# Patient Record
Sex: FEMALE | Race: WHITE | NOT HISPANIC OR LATINO | Employment: FULL TIME | URBAN - METROPOLITAN AREA
[De-identification: names, ages, dates, MRNs, and addresses within clinical notes are randomized per-mention and may not be internally consistent; named-entity substitution may affect disease eponyms.]

---

## 2017-05-24 ENCOUNTER — TRANSFERRED RECORDS (OUTPATIENT)
Dept: CARDIOLOGY | Facility: CLINIC | Age: 62
End: 2017-05-24

## 2017-06-23 ENCOUNTER — OFFICE VISIT (OUTPATIENT)
Dept: FAMILY MEDICINE | Facility: CLINIC | Age: 62
End: 2017-06-23
Payer: COMMERCIAL

## 2017-06-23 VITALS
TEMPERATURE: 97.8 F | BODY MASS INDEX: 32.3 KG/M2 | SYSTOLIC BLOOD PRESSURE: 124 MMHG | HEIGHT: 66 IN | HEART RATE: 74 BPM | DIASTOLIC BLOOD PRESSURE: 84 MMHG | WEIGHT: 201 LBS | OXYGEN SATURATION: 98 %

## 2017-06-23 DIAGNOSIS — C50.919 MALIGNANT NEOPLASM OF FEMALE BREAST, UNSPECIFIED LATERALITY, UNSPECIFIED SITE OF BREAST: ICD-10-CM

## 2017-06-23 DIAGNOSIS — M16.11 PRIMARY OSTEOARTHRITIS OF RIGHT HIP: Primary | ICD-10-CM

## 2017-06-23 PROCEDURE — 99214 OFFICE O/P EST MOD 30 MIN: CPT | Performed by: FAMILY MEDICINE

## 2017-06-23 NOTE — NURSING NOTE
"Chief Complaint   Patient presents with     Musculoskeletal Problem       Initial /84 (BP Location: Right arm, Patient Position: Chair, Cuff Size: Adult Large)  Pulse 74  Temp 97.8  F (36.6  C) (Tympanic)  Ht 5' 6\" (1.676 m)  Wt 201 lb (91.2 kg)  SpO2 98%  BMI 32.44 kg/m2 Estimated body mass index is 32.44 kg/(m^2) as calculated from the following:    Height as of this encounter: 5' 6\" (1.676 m).    Weight as of this encounter: 201 lb (91.2 kg).  Medication Reconciliation: complete     ,Jade Thurston CMA\    "

## 2017-06-23 NOTE — PROGRESS NOTES
SUBJECTIVE:                                                    Tyra Marsh is a 62 year old female who presents to clinic today for the following health issues:      Joint Pain     Onset: x 6 weeks    Description:   Location: right hip  Character: pain is worst when going up steps    Intensity: mild    Progression of Symptoms: same    Accompanying Signs & Symptoms:  Other symptoms: none   History:   Previous similar pain: no       Precipitating factors:   Trauma or overuse: no     Alleviating factors:  Improved by: rest/inactivity       Therapies Tried and outcome: none      Problem list and histories reviewed & adjusted, as indicated.  Additional history: as documented    Patient Active Problem List   Diagnosis     Hypothyroidism     Hyperlipidemia LDL goal <130     Breast cancer (H)     Unspecified adverse effect of unspecified drug, medicinal and biological substance     Adverse effect due to correct medicinal substance, properly administered     Malignant neoplasm of female breast, unspecified laterality, unspecified site of breast (H)     Past Surgical History:   Procedure Laterality Date     BREAST SURGERY  1999, 2002    Lumpectomies     DILATION AND CURETTAGE, OPERATIVE HYSTEROSCOPY WITH MORCELLATOR, COMBINED N/A 1/15/2016    Procedure: COMBINED DILATION AND CURETTAGE, OPERATIVE HYSTEROSCOPY WITH MORCELLATOR;  Surgeon: Leslee Pack MD;  [facility]     INSERT PORT VASCULAR ACCESS N/A 12/29/2014    Procedure: INSERT PORT VASCULAR ACCESS;  Surgeon: Heena Guerra MD;  Location: [facility] OR     MASTECTOMY SIMPLE BILATERAL, SENTINEL NODE BILATERAL, COMBINED Bilateral 12/29/2014    Procedure: COMBINED MASTECTOMY SIMPLE BILATERAL, SENTINEL NODE BILATERAL;  Surgeon: Heena Guerra MD;  Location: [facility] OR     REMOVE PORT VASCULAR ACCESS N/A 2/17/2016    Procedure: REMOVE PORT VASCULAR ACCESS;  Surgeon: Leonard Bella MD;  Location:  OR       Social History   Substance Use Topics      Smoking status: Never Smoker     Smokeless tobacco: Never Used     Alcohol use 0.0 oz/week     0 Standard drinks or equivalent per week      Comment: occ. glass wine     Family History   Problem Relation Age of Onset     C.A.D. Father       age 52     Genitourinary Problems Father      kidney disease     KIDNEY DISEASE Father      Thyroid Disease Mother      HEART DISEASE Mother      CEREBROVASCULAR DISEASE Mother      TIA     Thyroid Disease Sister      Gallbladder Disease Sister          Current Outpatient Prescriptions   Medication Sig Dispense Refill     calcium-vitamin D (CALTRATE) 600-400 MG-UNIT per tablet Take 1 tablet by mouth daily        Coenzyme Q10 (COQ10) 100 MG CAPS Take 1 capsule by mouth daily       Ascorbic Acid (VITAMIN C PO) Take 500 mg by mouth daily       SYNTHROID 112 MCG tablet Take 112 mcg by mouth daily   0     letrozole (FEMARA) 2.5 MG tablet Take 2.5 mg by mouth daily       Multiple Vitamins-Minerals (MULTIVITAMIN OR) Take by mouth daily        Misc Natural Products (EQL GREEN TEA COMPLEX PO) Take by mouth daily        LevOCARNitine (L-CARNITINE) 500 MG TABS Take by mouth daily        rosuvastatin (CRESTOR) 20 MG tablet Take by mouth daily       No Known Allergies  Recent Labs   Lab Test 12/30/15 10/07/15 01/07/14 02/17/11   LDL   --    --   117  103   HDL   --    --   61  54   TRIG   --    --   163  123   ALT  20  15  21   --    CR  0.75  0.77   --    --    GFRESTIMATED  >60  >60   --    --    GFRESTBLACK  >60  >60   --    --    POTASSIUM  3.7  4.2   --    --    TSH   --    --   0.39   --       BP Readings from Last 3 Encounters:   17 124/84   16 114/88   16 106/80    Wt Readings from Last 3 Encounters:   17 201 lb (91.2 kg)   16 199 lb 1.6 oz (90.3 kg)   16 193 lb 14.4 oz (88 kg)                    Reviewed and updated as needed this visit by clinical staff       Reviewed and updated as needed this visit by Provider      "    ROS:  Constitutional, HEENT, cardiovascular, pulmonary, gi and gu systems are negative, except as otherwise noted.    OBJECTIVE:                                                    /84 (BP Location: Right arm, Patient Position: Chair, Cuff Size: Adult Large)  Pulse 74  Temp 97.8  F (36.6  C) (Tympanic)  Ht 5' 6\" (1.676 m)  Wt 201 lb (91.2 kg)  SpO2 98%  BMI 32.44 kg/m2  Body mass index is 32.44 kg/(m^2).  GENERAL: healthy, alert and no distress  NECK: no adenopathy, no asymmetry, masses, or scars and thyroid normal to palpation  RESP: lungs clear to auscultation - no rales, rhonchi or wheezes  CV: regular rate and rhythm, normal S1 S2, no S3 or S4, no murmur, click or rub, no peripheral edema and peripheral pulses strong  ABDOMEN: soft, nontender, no hepatosplenomegaly, no masses and bowel sounds normal  MS: ROSEANNA positive on right, negative SLRT, point tenderness on right trochanter           ASSESSMENT/PLAN:                                                    Tyra was seen today for musculoskeletal problem.    Diagnoses and all orders for this visit:    Primary osteoarthritis of right hip  -     MR Hip Right w/o Contrast; Future    Malignant neoplasm of female breast, unspecified laterality, unspecified site of breast (H)      Has been having worsening right hip pain without radiculopathy, has h/o breast cancer recurrence, her last tx was last year, currently she is only on fumara,   Will have her to do MR to make sure if it is related with bursitis or DJD vs other neoplastic disease related with breast cancer         Ar Amor MD  [facility] [place]  "

## 2017-06-23 NOTE — MR AVS SNAPSHOT
After Visit Summary   6/23/2017    Tyra Marsh    MRN: [id number]           Patient Information     Date Of Birth          1955        Visit Information        Provider Department      6/23/2017 11:00 AM Ar Amor MD [facility]        Today's Diagnoses     Primary osteoarthritis of right hip    -  1    Malignant neoplasm of female breast, unspecified laterality, unspecified site of breast (H)           Follow-ups after your visit        Future tests that were ordered for you today     Open Future Orders        Priority Expected Expires Ordered    MR Hip Right w/o Contrast Routine  6/23/2018 6/23/2017            Who to contact     If you have questions or need follow up information about today's clinic visit or your schedule please contact [facility] directly at [phone].  Normal or non-critical lab and imaging results will be communicated to you by MyChart, letter or phone within 4 business days after the clinic has received the results. If you do not hear from us within 7 days, please contact the clinic through MyChart or phone. If you have a critical or abnormal lab result, we will notify you by phone as soon as possible.  Submit refill requests through [employer] or call your pharmacy and they will forward the refill request to us. Please allow 3 business days for your refill to be completed.          Additional Information About Your Visit        MyChart Information     [employer] gives you secure access to your electronic health record. If you see a primary care provider, you can also send messages to your care team and make appointments. If you have questions, please call your primary care clinic.  If you do not have a primary care provider, please call [phone] and they will assist you.        Care EveryWhere ID     This is your Care EveryWhere ID. This could be used by other organizations to access your [facility]  "records  [id number]        Your Vitals Were     Pulse Temperature Height Pulse Oximetry BMI (Body Mass Index)       74 97.8  F (36.6  C) (Tympanic) 5' 6\" (1.676 m) 98% 32.44 kg/m2        Blood Pressure from Last 3 Encounters:   06/23/17 124/84   11/11/16 114/88   05/19/16 106/80    Weight from Last 3 Encounters:   06/23/17 201 lb (91.2 kg)   11/11/16 199 lb 1.6 oz (90.3 kg)   05/19/16 193 lb 14.4 oz (88 kg)               Primary Care Provider Office Phone # Fax #    Ar ALEJANDRA Amor [phone]       [address] [address]        Equal Access to Services     PHYLICIA RHOADES : Hadii aad ku hadasho Sosummer, waaxda luqadaha, qaybta kaalmada adenelyyajeff, kalyn hart . So [facility] [phone].    ATENCIÓN: Si habla español, tiene a arnett disposición servicios gratuitos de asistencia lingüística. Yokasta al [phone].    We comply with applicable federal civil rights laws and [place] laws. We do not discriminate on the basis of race, color, national origin, age, disability sex, sexual orientation or gender identity.            Thank you!     Thank you for choosing [facility]  for your care. Our goal is always to provide you with excellent care. Hearing back from our patients is one way we can continue to improve our services. Please take a few minutes to complete the written survey that you may receive in the mail after your visit with us. Thank you!             Your Updated Medication List - Protect others around you: Learn how to safely use, store and throw away your medicines at www.disposemymeds.org.          This list is accurate as of: 6/23/17 11:19 AM.  Always use your most recent med list.                   Brand Name Dispense Instructions for use Diagnosis    calcium-vitamin D 600-400 MG-UNIT per tablet    CALTRATE     Take 1 tablet by mouth daily        CoQ10 100 MG Caps      Take 1 capsule by mouth daily     "    EQL GREEN TEA COMPLEX PO      Take by mouth daily        FEMARA 2.5 MG tablet   Generic drug:  letrozole      Take 2.5 mg by mouth daily        L-Carnitine 500 MG Tabs      Take by mouth daily        MULTIVITAMIN PO      Take by mouth daily        rosuvastatin 20 MG tablet    CRESTOR     Take by mouth daily        SYNTHROID 112 MCG tablet   Generic drug:  levothyroxine      Take 112 mcg by mouth daily        VITAMIN C PO      Take 500 mg by mouth daily

## 2017-06-25 ENCOUNTER — TRANSFERRED RECORDS (OUTPATIENT)
Dept: HEALTH INFORMATION MANAGEMENT | Facility: CLINIC | Age: 62
End: 2017-06-25

## 2017-06-26 ENCOUNTER — TELEPHONE (OUTPATIENT)
Dept: FAMILY MEDICINE | Facility: CLINIC | Age: 62
End: 2017-06-26

## 2017-06-26 DIAGNOSIS — M25.551 HIP PAIN, RIGHT: Primary | ICD-10-CM

## 2017-06-26 DIAGNOSIS — M70.61 TROCHANTERIC BURSITIS OF RIGHT HIP: ICD-10-CM

## 2017-06-27 NOTE — TELEPHONE ENCOUNTER
Jes has acute inflammation on tendon and muscle on the thigh, and also has mild arthritis on hip joint. Plus, she has bursitis on the hip as was discussed during the visit   Fortunately, there is no mass nor metastatic lesion we wanted to rule out.     Conservative management including antiinflammatory medicine and PT might be a best option, and cortisone IJ on the bursa would help to control pain on the hip.   I will place PT order for her to start at [facility], please ask her if she is interested in bursa IJ  thx

## 2017-12-14 ENCOUNTER — TRANSFERRED RECORDS (OUTPATIENT)
Dept: HEALTH INFORMATION MANAGEMENT | Facility: CLINIC | Age: 62
End: 2017-12-14

## 2018-06-15 ENCOUNTER — TRANSFERRED RECORDS (OUTPATIENT)
Dept: HEALTH INFORMATION MANAGEMENT | Facility: CLINIC | Age: 63
End: 2018-06-15

## 2018-12-20 ENCOUNTER — TRANSFERRED RECORDS (OUTPATIENT)
Dept: HEALTH INFORMATION MANAGEMENT | Facility: CLINIC | Age: 63
End: 2018-12-20

## 2019-06-14 ENCOUNTER — HOSPITAL ENCOUNTER (OUTPATIENT)
Dept: BONE DENSITY | Facility: CLINIC | Age: 64
Discharge: HOME | End: 2019-06-14
Attending: INTERNAL MEDICINE | Admitting: INTERNAL MEDICINE
Payer: COMMERCIAL

## 2019-06-14 DIAGNOSIS — C50.919 BREAST CANCER (H): ICD-10-CM

## 2019-06-14 PROCEDURE — 77080 DXA BONE DENSITY AXIAL: CPT

## 2019-06-21 ENCOUNTER — TRANSFERRED RECORDS (OUTPATIENT)
Dept: HEALTH INFORMATION MANAGEMENT | Facility: CLINIC | Age: 64
End: 2019-06-21

## 2019-09-27 ENCOUNTER — TRANSFERRED RECORDS (OUTPATIENT)
Dept: HEALTH INFORMATION MANAGEMENT | Facility: CLINIC | Age: 64
End: 2019-09-27

## 2020-01-10 ENCOUNTER — TRANSFERRED RECORDS (OUTPATIENT)
Dept: HEALTH INFORMATION MANAGEMENT | Facility: CLINIC | Age: 65
End: 2020-01-10

## 2020-01-22 ENCOUNTER — OFFICE VISIT (OUTPATIENT)
Dept: FAMILY MEDICINE | Facility: CLINIC | Age: 65
End: 2020-01-22
Payer: COMMERCIAL

## 2020-01-22 ENCOUNTER — ANCILLARY PROCEDURE (OUTPATIENT)
Dept: GENERAL RADIOLOGY | Facility: CLINIC | Age: 65
End: 2020-01-22
Attending: NURSE PRACTITIONER
Payer: COMMERCIAL

## 2020-01-22 ENCOUNTER — NURSE TRIAGE (OUTPATIENT)
Dept: FAMILY MEDICINE | Facility: CLINIC | Age: 65
End: 2020-01-22

## 2020-01-22 VITALS
TEMPERATURE: 98.2 F | HEART RATE: 103 BPM | DIASTOLIC BLOOD PRESSURE: 86 MMHG | OXYGEN SATURATION: 95 % | SYSTOLIC BLOOD PRESSURE: 124 MMHG | HEIGHT: 66 IN | WEIGHT: 197 LBS | BODY MASS INDEX: 31.66 KG/M2

## 2020-01-22 DIAGNOSIS — R50.9 FEVER, UNSPECIFIED FEVER CAUSE: ICD-10-CM

## 2020-01-22 DIAGNOSIS — R05.9 COUGH: ICD-10-CM

## 2020-01-22 DIAGNOSIS — J20.9 ACUTE BRONCHITIS WITH SYMPTOMS > 10 DAYS: Primary | ICD-10-CM

## 2020-01-22 DIAGNOSIS — J10.1 INFLUENZA A: ICD-10-CM

## 2020-01-22 LAB
FLUAV+FLUBV AG SPEC QL: NEGATIVE
FLUAV+FLUBV AG SPEC QL: POSITIVE
SPECIMEN SOURCE: ABNORMAL

## 2020-01-22 PROCEDURE — 99214 OFFICE O/P EST MOD 30 MIN: CPT | Performed by: NURSE PRACTITIONER

## 2020-01-22 PROCEDURE — 71046 X-RAY EXAM CHEST 2 VIEWS: CPT | Mod: FY

## 2020-01-22 PROCEDURE — 87804 INFLUENZA ASSAY W/OPTIC: CPT | Performed by: NURSE PRACTITIONER

## 2020-01-22 RX ORDER — AZITHROMYCIN 250 MG/1
TABLET, FILM COATED ORAL
Qty: 6 TABLET | Refills: 0 | Status: SHIPPED | OUTPATIENT
Start: 2020-01-22 | End: 2020-01-27

## 2020-01-22 RX ORDER — BENZONATATE 200 MG/1
200 CAPSULE ORAL 3 TIMES DAILY PRN
Qty: 18 CAPSULE | Refills: 1 | Status: SHIPPED | OUTPATIENT
Start: 2020-01-22 | End: 2021-09-15

## 2020-01-22 ASSESSMENT — MIFFLIN-ST. JEOR: SCORE: 1470.34

## 2020-01-22 NOTE — TELEPHONE ENCOUNTER
Pt calling with cough that started on Thursday 1/16 and not improving.   was diagnosed with influenza yesterday.  Pt thinks she might have pneumonia or bronchitis and wants someone to listen to her lungs or possible chest xray.  Pt did get a flu shot this year.    Scheduled with Hilario today at 8:40.    Leann CADENA RN  EP Triage      Additional Information    Negative: Bluish (or gray) lips or face    Negative: Severe difficulty breathing (e.g., struggling for each breath, speaks in single words)    Negative: Rapid onset of cough and has hives    Negative: Coughing started suddenly after medicine, an allergic food or bee sting    Negative: Difficulty breathing after exposure to flames, smoke, or fumes    Negative: Sounds like a life-threatening emergency to the triager    Negative: Previous asthma attacks and this feels like asthma attack    Negative: Chest pain present when not coughing    Negative: Difficulty breathing    Negative: Passed out (i.e., fainted, collapsed and was not responding)    Negative: Patient sounds very sick or weak to the triager    Negative: Coughed up > 1 tablespoon (15 ml) blood (Exception: blood-tinged sputum)    Negative: Fever > 103 F (39.4 C)    Negative: Fever > 101 F (38.3 C) and over 60 years of age    Negative: Fever > 100.0 F (37.8 C) and has diabetes mellitus or a weak immune system (e.g., HIV positive, cancer chemotherapy, organ transplant, splenectomy, chronic steroids)    Negative: Fever > 100.0 F (37.8 C) and bedridden (e.g., nursing home patient, stroke, chronic illness, recovering from surgery)    Negative: Increasing ankle swelling    Negative: Wheezing is present    Negative: SEVERE coughing spells (e.g., whooping sound after coughing, vomiting after coughing)    Negative: Coughing up wendy-colored (reddish-brown) or blood-tinged sputum    Negative: Fever present > 3 days (72 hours)    Negative: Fever returns after gone for over 24 hours and symptoms worse or not  "improved    Negative: Using nasal washes and pain medicine > 24 hours and sinus pain persists    Negative: Known COPD or other severe lung disease (i.e., bronchiectasis, cystic fibrosis, lung surgery) and worsening symptoms (i.e., increased sputum purulence or amount, increased breathing difficulty)    Patient wants to be seen    Answer Assessment - Initial Assessment Questions  1. ONSET: \"When did the cough begin?\"       Thursday 1/16  2. SEVERITY: \"How bad is the cough today?\"       Coughing has increased and had a coughing spell last night where I could not catch my breath.    3. RESPIRATORY DISTRESS: \"Describe your breathing.\"       Worse with lying down and I think I hear wheezing  4. FEVER: \"Do you have a fever?\" If so, ask: \"What is your temperature, how was it measured, and when did it start?\"      I am usually 97 but temp has been running 98.? To 101 a couple of days ago  5. HEMOPTYSIS: \"Are you coughing up any blood?\" If so ask: \"How much?\" (flecks, streaks, tablespoons, etc.)      no  6. TREATMENT: \"What have you done so far to treat the cough?\" (e.g., meds, fluids, humidifier)      Taking otc medication with little relief.  7. CARDIAC HISTORY: \"Do you have any history of heart disease?\" (e.g., heart attack, congestive heart failure)       no  8. LUNG HISTORY: \"Do you have any history of lung disease?\"  (e.g., pulmonary embolus, asthma, emphysema)      no  9. PE RISK FACTORS: \"Do you have a history of blood clots?\" (or: recent major surgery, recent prolonged travel, bedridden )      no  10. OTHER SYMPTOMS: \"Do you have any other symptoms? (e.g., runny nose, wheezing, chest pain)        Wheezing I think  11. PREGNANCY: \"Is there any chance you are pregnant?\" \"When was your last menstrual period?\"        no  12. TRAVEL: \"Have you traveled out of the country in the last month?\" (e.g., travel history, exposures)        no    Protocols used: COUGH-A-[place]      "

## 2020-01-22 NOTE — PROGRESS NOTES
Subjective     Tyra Marsh is a 64 year old female who presents to clinic today for the following health issues:      RESPIRATORY SYMPTOMS      Duration: x Thursday     Description  Productive cough, wheezing, fatigue/malaise and mild chest congestion     Severity: moderate    Accompanying signs and symptoms: None    History (predisposing factors):  none    Precipitating or alleviating factors: None    Therapies tried and outcome: otc cold med     HPI: Jory presents today with the complaint of ongoing, aggressive cough. She states that she developed a cough and fever spontaneously last Thursday (6 days ago). Her fever has improved / resolved, but she states that she is now struggling with a pervasive, aggressive cough. She states that she coughs so hard that she cannot catch her breath at times. Her  was diagnosed with influenza A yesterday, but his symptoms are more constitutional (fever, chills, body aches) with only a mild cough. She is concerned about developing pneumonia. OTC remedies are only mildly helpful.     Patient Active Problem List   Diagnosis     Hypothyroidism     Hyperlipidemia LDL goal <130     Breast cancer (H)     Unspecified adverse effect of unspecified drug, medicinal and biological substance     Adverse effect due to correct medicinal substance, properly administered     Malignant neoplasm of female breast, unspecified laterality, unspecified site of breast     Past Surgical History:   Procedure Laterality Date     BREAST SURGERY  1999, 2002    Lumpectomies     DILATION AND CURETTAGE, OPERATIVE HYSTEROSCOPY WITH MORCELLATOR, COMBINED N/A 1/15/2016    Procedure: COMBINED DILATION AND CURETTAGE, OPERATIVE HYSTEROSCOPY WITH MORCELLATOR;  Surgeon: Leslee Pack MD;  Location: [facility]     INSERT PORT VASCULAR ACCESS N/A 12/29/2014    Procedure: INSERT PORT VASCULAR ACCESS;  Surgeon: Heena Guerra MD;  Location: [facility] OR     MASTECTOMY SIMPLE BILATERAL, SENTINEL NODE  "BILATERAL, COMBINED Bilateral 2014    Procedure: COMBINED MASTECTOMY SIMPLE BILATERAL, SENTINEL NODE BILATERAL;  Surgeon: Heena Guerra MD;  Location: [facility] OR     REMOVE PORT VASCULAR ACCESS N/A 2016    Procedure: REMOVE PORT VASCULAR ACCESS;  Surgeon: Leonard Bella MD;  Location:  OR       Social History     Tobacco Use     Smoking status: Never Smoker     Smokeless tobacco: Never Used   Substance Use Topics     Alcohol use: Yes     Alcohol/week: 0.0 standard drinks     Comment: occ. glass wine     Family History   Problem Relation Age of Onset     C.A.D. Father          age 52     Genitourinary Problems Father         kidney disease     Kidney Disease Father      Thyroid Disease Mother      Heart Disease Mother      Cerebrovascular Disease Mother         TIA     Thyroid Disease Sister      Gallbladder Disease Sister            Reviewed and updated as needed this visit by Provider  Tobacco  Allergies  Meds  Problems  Med Hx  Surg Hx  Fam Hx         Review of Systems   ROS COMP: Constitutional, HEENT, pulmonary systems are negative, except as otherwise noted.      Objective    /86   Pulse 103   Temp 98.2  F (36.8  C) (Tympanic)   Ht 1.676 m (5' 6\")   Wt 89.4 kg (197 lb)   SpO2 95%   BMI 31.80 kg/m    Body mass index is 31.8 kg/m .  Physical Exam   GENERAL: healthy, alert and no distress  HENT: ear canals and TM's normal, nose and mouth without ulcers or lesions  NECK: no adenopathy, no asymmetry, masses, or scars and thyroid normal to palpation  RESP: Coarse rhonchi noted throughout; No wheeze; Moist cough; Question rales in RLL; Chest excursion, respiratory rate, and ventilatory effort / ease within normal limits. No indicators of respiratory distress.  CV: regular rate and rhythm, normal S1 S2, no S3 or S4, no murmur, click or rub, no peripheral edema and peripheral pulses strong  NEURO: Normal strength and tone, mentation intact and speech normal    Diagnostic Test " Results:  Results for orders placed or performed in visit on 01/22/20 (from the past 24 hour(s))   Influenza A/B antigen   Result Value Ref Range    Influenza A/B Agn Specimen Nasopharyngeal     Influenza A Positive (A) NEG^Negative    Influenza B Negative NEG^Negative     CXR - Lung fields clear bilaterally. No evidence of infiltrate, pneumothorax, or pleural effusion. Cardiac silhouette normal in appearance.        Assessment & Plan     Tyra was seen today for fever and cough. Not surprisingly, she is also positive for influenza A ( diagnosed yesterday). She is well outside of the recommended treatment window, so antiviral therapy is deferred today. Her oxygenation (95%), aggressive / wet cough, and adventitious lung sounds prompted investigation for evidence of pneumonia or other influenza-related respiratory complication. CXR was reassuringly normal, but nature of her symptoms and marginal oxygenation prompted me to offer antimicrobial therapy for potential bacterially-driven bronchitis. Benzonatate also given for cough. Symptomatic cares (see patient instructions) and follow up precautions discussed in detail. Jory acknowledges and demonstrates understanding of circumstances under which care should be sought urgently or emergently. Follow up as discussed. Discussed risks, benefits, alternatives, potential side effects, and proper administration of new medication / treatment. Agrees with plan of care. All questions answered.     Diagnoses and all orders for this visit:    Acute bronchitis with symptoms > 10 days  -     azithromycin (ZITHROMAX) 250 MG tablet; Take 2 tablets (500 mg) by mouth daily for 1 day, THEN 1 tablet (250 mg) daily for 4 days.    Influenza A  Comment: Outside of treatment window and without risk factors to prompt antiviral treatment.     Cough  -     XR Chest 2 Views; Future  -     benzonatate (TESSALON) 200 MG capsule; Take 1 capsule (200 mg) by mouth 3 times daily as needed for  "cough    Fever, unspecified fever cause  -     XR Chest 2 Views; Future  -     Influenza A/B antigen         BMI:   Estimated body mass index is 31.8 kg/m  as calculated from the following:    Height as of this encounter: 1.676 m (5' 6\").    Weight as of this encounter: 89.4 kg (197 lb).   Weight management plan: Discussed healthy diet and exercise guidelines      See Patient Instructions    Return in about 3 days (around 1/25/2020) for persistent or worsening symptoms.    Andreas Vazquez NP  [facility] [place]    "

## 2020-01-22 NOTE — TELEPHONE ENCOUNTER
Reason for Call:  Other call back    Detailed comments: Pt wants an appt for cough. Slight fever, Spouse was in yesterday and seen George.  Spouse has influenza A.  Pt thinks maybe has pneumonia wants us to listen to lungs    Phone Number Patient can be reached at: Home number on file [phone] (home)    Best Time: anytime    Can we leave a detailed message on this number? YES    Call taken on 1/22/2020 at 7:05 AM by Bina Zhou

## 2020-10-30 ENCOUNTER — TRANSFERRED RECORDS (OUTPATIENT)
Dept: HEALTH INFORMATION MANAGEMENT | Facility: CLINIC | Age: 65
End: 2020-10-30

## 2021-07-19 ENCOUNTER — ANCILLARY PROCEDURE (OUTPATIENT)
Dept: GENERAL RADIOLOGY | Facility: CLINIC | Age: 66
End: 2021-07-19
Attending: PHYSICIAN ASSISTANT
Payer: COMMERCIAL

## 2021-07-19 ENCOUNTER — OFFICE VISIT (OUTPATIENT)
Dept: FAMILY MEDICINE | Facility: CLINIC | Age: 66
End: 2021-07-19
Payer: COMMERCIAL

## 2021-07-19 VITALS
SYSTOLIC BLOOD PRESSURE: 136 MMHG | OXYGEN SATURATION: 97 % | BODY MASS INDEX: 32.3 KG/M2 | HEIGHT: 66 IN | DIASTOLIC BLOOD PRESSURE: 86 MMHG | HEART RATE: 73 BPM | WEIGHT: 201 LBS | TEMPERATURE: 97.7 F
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
## 2021-07-19 DIAGNOSIS — R07.89 CHEST WALL PAIN: ICD-10-CM

## 2021-07-19 PROCEDURE — 71101 X-RAY EXAM UNILAT RIBS/CHEST: CPT | Mod: RT | Performed by: RADIOLOGY

## 2021-07-19 PROCEDURE — 99213 OFFICE O/P EST LOW 20 MIN: CPT | Performed by: PHYSICIAN ASSISTANT

## 2021-07-19 ASSESSMENT — ENCOUNTER SYMPTOMS
COLOR CHANGE: 1
CARDIOVASCULAR NEGATIVE: 1
SHORTNESS OF BREATH: 0
MYALGIAS: 1
CONSTITUTIONAL NEGATIVE: 1
EYES NEGATIVE: 1

## 2021-07-19 ASSESSMENT — PAIN SCALES - GENERAL: PAINLEVEL: SEVERE PAIN (6)

## 2021-07-19 ASSESSMENT — MIFFLIN-ST. JEOR: SCORE: 1478.48

## 2021-07-19 NOTE — PROGRESS NOTES
"    Assessment & Plan     Fall, sequela  - XR Ribs & Chest Right G/E 3 Views    Chest wall pain  We discussed the patient's chest wall pain as a sequela from her previous fall. A chest Xray was performed and is negative per my read.  I will reach out when I have the formal radiology report. Discussed resting, ice, and pain control through ibuprofen and tylenol. In addition, the patient was educated on the avoidance of shallow breathing that can lead to pneumonia with this condition.   - XR Ribs & Chest Right G/E 3 Views         BMI:   Estimated body mass index is 32.44 kg/m  as calculated from the following:    Height as of this encounter: 1.676 m (5' 6\").    Weight as of this encounter: 91.2 kg (201 lb).     Return in about 4 weeks (around 8/16/2021) for if new or worsening symptoms.    [facility] [place]    Kinga Corley is a 66 year old who presents for the following health issues   HPI     Musculoskeletal problem/pain  Onset/Duration: July 4th  Description  Location: ribs - right  Joint Swelling: no  Redness: no  Pain: YES  Warmth: no  Intensity:  moderate  Progression of Symptoms:  worsening  Accompanying signs and symptoms:   Fevers: no  Numbness/tingling/weakness: no  History  Trauma to the area: YES- fell forwards and hit very hard  Recent illness:  no  Previous similar problem: no  Previous evaluation:  no  Precipitating or alleviating factors:  Aggravating factors include: MOVEMENT  Therapies tried and outcome: ice and acetaminophen    Jory reports a fall on July 4th where she injured her left wrist and ribs on the right side. She was evaluated by a provider that day and they made the decision not to X-ray the affected area. Initially endorsed bruising and pain to the area which was improving. Yesterday the patient reports a sharp increase in pain during the night which woke her up from sleep. She had been lifting heavy grocery bags during the day with no " "other excessive exercises. She denies chest pain and shortness of breath but does find it painful to take deep breaths.     Review of Systems   Constitutional: Negative.    HENT: Negative.    Eyes: Negative.    Respiratory: Negative for shortness of breath.    Cardiovascular: Negative.    Musculoskeletal: Positive for myalgias.        Right lateral ribs tender to palpation.    Skin: Positive for color change.        Bruising to the right lateral rib area.          Objective    /86   Pulse 73   Temp 97.7  F (36.5  C) (Tympanic)   Ht 1.676 m (5' 6\")   Wt 91.2 kg (201 lb)   SpO2 97%   BMI 32.44 kg/m    Body mass index is 32.44 kg/m .  GENERAL: healthy, alert and no distress  EYES: Eyes grossly normal to inspection, PERRL and conjunctivae and sclerae normal  HENT: ear canals and TM's normal, nose and mouth without ulcers or lesions  NECK: no adenopathy, no asymmetry, masses, or scars and thyroid normal to palpation  RESP: lungs clear to auscultation - no rales, rhonchi or wheezes, TTP along right lateral chest wall  CV: regular rate and rhythm, normal S1 S2, no S3 or S4, no murmur, click or rub, no peripheral edema and peripheral pulses strong  ABDOMEN: soft, nontender, no hepatosplenomegaly, no masses and bowel sounds normal  MS: no gross musculoskeletal defects noted, no edema  SKIN: no suspicious lesions or rashes  NEURO: Normal strength and tone, mentation intact and speech normal  PSYCH: mentation appears normal, affect normal/bright               "

## 2021-07-22 NOTE — RESULT ENCOUNTER NOTE
Tyra-    I reached out to radiology for a second read.  The radiologist does not see evidence of a pneumothorax. Please continue with tylenol for pain and let me know how you are doing.  If your breathing symptoms worsen, we can reevaluate as we discussed on the phone.  Glenn Jerry PA-C

## 2021-08-17 ENCOUNTER — OFFICE VISIT (OUTPATIENT)
Dept: FAMILY MEDICINE | Facility: CLINIC | Age: 66
End: 2021-08-17
Payer: COMMERCIAL

## 2021-08-17 VITALS
SYSTOLIC BLOOD PRESSURE: 138 MMHG | OXYGEN SATURATION: 97 % | TEMPERATURE: 97.7 F | DIASTOLIC BLOOD PRESSURE: 84 MMHG | WEIGHT: 201.8 LBS | BODY MASS INDEX: 32.57 KG/M2 | HEART RATE: 85 BPM

## 2021-08-17 DIAGNOSIS — S22.31XD CLOSED FRACTURE OF ONE RIB OF RIGHT SIDE WITH ROUTINE HEALING, SUBSEQUENT ENCOUNTER: ICD-10-CM

## 2021-08-17 DIAGNOSIS — R07.89 CHEST WALL PAIN: Primary | ICD-10-CM

## 2021-08-17 PROCEDURE — 99213 OFFICE O/P EST LOW 20 MIN: CPT | Performed by: PHYSICIAN ASSISTANT

## 2021-08-17 ASSESSMENT — PAIN SCALES - GENERAL: PAINLEVEL: MODERATE PAIN (4)

## 2021-08-17 NOTE — PROGRESS NOTES
"    Assessment & Plan     Closed fracture of one rib of right side with routine healing, subsequent encounter  reassurance provided today.  Pain is likely MSK in origin secondary to healing fracture.  Lungs are clear today and vitals are stable She has no new SOB or cough to suggest infection.  Advise that she take ibuprofen/tylneol for pain control.  May consider giving a narcotic for use prior to an upcoming derm procedure where she will need to lay on her stomach.  She will call me if pain not improved in 3 weeks and she is needing this medication.     Chest wall pain           BMI:   Estimated body mass index is 32.57 kg/m  as calculated from the following:    Height as of 7/19/21: 1.676 m (5' 6\").    Weight as of this encounter: 91.5 kg (201 lb 12.8 oz).         Return in about 4 weeks (around 9/14/2021) for f persistent symptoms.    [facility]   Jory is a 66 year old who presents for the following health issues     HPI           Jory is a 67 y/o female patient with recent right sided anterior 9th rib fracture that occurred after a fall 6 weeks ago. Overall, symptoms have been improving but she has been having some pain in her right side and back adjacent to the fracture location and some sharp pain when she sneezes or coughs.  She does not have pain with deep breathing.  No SOB or new coughing. She is not taking anything for pain.         Review of Systems   Constitutional, HEENT, cardiovascular, pulmonary, gi and gu systems are negative, except as otherwise noted.      Objective    /84 (Cuff Size: Adult Large)   Pulse 85   Temp 97.7  F (36.5  C) (Tympanic)   Wt 91.5 kg (201 lb 12.8 oz)   SpO2 97%   BMI 32.57 kg/m    Body mass index is 32.57 kg/m .  Physical Exam   GENERAL: healthy, alert and no distress  RESP: lungs clear to auscultation - no rales, rhonchi or wheezes, TTP along right anterier, lateral and posterior chest wall  CV: " regular rate and rhythm, normal S1 S2, no S3 or S4, no murmur, click or rub,  PSYCH: mentation appears normal, affect normal/bright

## 2021-09-15 ENCOUNTER — OFFICE VISIT (OUTPATIENT)
Dept: FAMILY MEDICINE | Facility: CLINIC | Age: 66
End: 2021-09-15
Payer: COMMERCIAL

## 2021-09-15 ENCOUNTER — ANCILLARY PROCEDURE (OUTPATIENT)
Dept: GENERAL RADIOLOGY | Facility: CLINIC | Age: 66
End: 2021-09-15
Attending: FAMILY MEDICINE
Payer: COMMERCIAL

## 2021-09-15 VITALS
WEIGHT: 203 LBS | SYSTOLIC BLOOD PRESSURE: 132 MMHG | DIASTOLIC BLOOD PRESSURE: 82 MMHG | TEMPERATURE: 98.3 F | RESPIRATION RATE: 20 BRPM | OXYGEN SATURATION: 97 % | HEART RATE: 81 BPM | BODY MASS INDEX: 32.77 KG/M2

## 2021-09-15 DIAGNOSIS — R07.89 CHEST WALL PAIN: ICD-10-CM

## 2021-09-15 DIAGNOSIS — S22.31XD CLOSED FRACTURE OF ONE RIB OF RIGHT SIDE WITH ROUTINE HEALING, SUBSEQUENT ENCOUNTER: ICD-10-CM

## 2021-09-15 DIAGNOSIS — R07.89 CHEST WALL PAIN: Primary | ICD-10-CM

## 2021-09-15 DIAGNOSIS — C50.919 MALIGNANT NEOPLASM OF FEMALE BREAST, UNSPECIFIED ESTROGEN RECEPTOR STATUS, UNSPECIFIED LATERALITY, UNSPECIFIED SITE OF BREAST (H): ICD-10-CM

## 2021-09-15 PROCEDURE — 99213 OFFICE O/P EST LOW 20 MIN: CPT | Performed by: FAMILY MEDICINE

## 2021-09-15 PROCEDURE — 71101 X-RAY EXAM UNILAT RIBS/CHEST: CPT | Mod: RT | Performed by: RADIOLOGY

## 2021-09-15 ASSESSMENT — PAIN SCALES - GENERAL: PAINLEVEL: NO PAIN (0)

## 2021-09-15 NOTE — PROGRESS NOTES
"    Assessment & Plan     Chest wall pain  Had fall and rib fracture 2 months ago, has mild cough and postnasal drainage, new cxr showed no abnormal change nor mass nor sign of infection   Will keep monitoring without additional work up  - XR Ribs & Chest Right G/E 3 Views; Future    Closed fracture of one rib of right side with routine healing, subsequent encounter  Mentioned above   - XR Ribs & Chest Right G/E 3 Views; Future             BMI:   Estimated body mass index is 32.77 kg/m  as calculated from the following:    Height as of 7/19/21: 1.676 m (5' 6\").    Weight as of this encounter: 92.1 kg (203 lb).   Weight management plan: Patient referred to endocrine and/or weight management specialty    FUTURE APPOINTMENTS:       - Follow-up visit in 6 months for CPE    No follow-ups on file.    Ar Amor MD  [facility]   Pat is a 66 year old who presents for the following health issues     HPI     Concern - rib pain   Onset:  Ongoing   Description:  Pt is having pain on right side of ribcage   Intensity: mild  Progression of Symptoms:  worsening  Accompanying Signs & Symptoms: gurgling sound in when breathing in   Previous history of similar problem:   Precipitating factors:        Worsened by:   Alleviating factors:        Improved by:   Therapies tried and outcome: warm saltwater         Review of Systems   Constitutional, HEENT, cardiovascular, pulmonary, gi and gu systems are negative, except as otherwise noted.      Objective    /82   Pulse 81   Temp 98.3  F (36.8  C) (Tympanic)   Resp 20   Wt 92.1 kg (203 lb)   SpO2 97%   BMI 32.77 kg/m    Body mass index is 32.77 kg/m .  Physical Exam   GENERAL: healthy, alert and no distress  NECK: no adenopathy, no asymmetry, masses, or scars and thyroid normal to palpation  RESP: lungs clear to auscultation - no rales, rhonchi or wheezes  CV: regular rate and rhythm, normal S1 S2, no S3 or S4, no murmur, click or rub, " no peripheral edema and peripheral pulses strong  ABDOMEN: soft, nontender, no hepatosplenomegaly, no masses and bowel sounds normal  MS: no gross musculoskeletal defects noted, no edema

## 2021-12-20 ENCOUNTER — E-VISIT (OUTPATIENT)
Dept: URGENT CARE | Facility: CLINIC | Age: 66
End: 2021-12-20
Payer: COMMERCIAL

## 2021-12-20 DIAGNOSIS — R30.0 DIFFICULT OR PAINFUL URINATION: Primary | ICD-10-CM

## 2021-12-20 PROCEDURE — 99207 PR NON-BILLABLE SERV PER CHARTING: CPT | Performed by: FAMILY MEDICINE

## 2021-12-20 NOTE — PATIENT INSTRUCTIONS
Dear Tyra Marsh,    We are sorry you are not feeling well. Based on the responses you provided, it is recommended that you be seen in-person in urgent care so we can better evaluate your symptoms. Please click here to find the nearest urgent care location to you.   You will not be charged for this Visit. Thank you for trusting us with your care.    Yudi Frances MD

## 2022-01-12 ENCOUNTER — TRANSFERRED RECORDS (OUTPATIENT)
Dept: HEALTH INFORMATION MANAGEMENT | Facility: CLINIC | Age: 67
End: 2022-01-12
Payer: COMMERCIAL

## 2022-01-20 ENCOUNTER — OFFICE VISIT (OUTPATIENT)
Dept: URGENT CARE | Facility: URGENT CARE | Age: 67
End: 2022-01-20
Payer: COMMERCIAL

## 2022-01-20 VITALS — DIASTOLIC BLOOD PRESSURE: 92 MMHG | HEART RATE: 111 BPM | SYSTOLIC BLOOD PRESSURE: 152 MMHG | TEMPERATURE: 97.9 F

## 2022-01-20 DIAGNOSIS — R30.0 DYSURIA: Primary | ICD-10-CM

## 2022-01-20 DIAGNOSIS — R03.0 ELEVATED BLOOD PRESSURE READING WITHOUT DIAGNOSIS OF HYPERTENSION: ICD-10-CM

## 2022-01-20 LAB
ALBUMIN SERPL-MCNC: 3.8 G/DL (ref 3.4–5)
ALBUMIN UR-MCNC: NEGATIVE MG/DL
ALP SERPL-CCNC: 135 U/L (ref 40–150)
ALT SERPL W P-5'-P-CCNC: 27 U/L (ref 0–50)
ANION GAP SERPL CALCULATED.3IONS-SCNC: 5 MMOL/L (ref 3–14)
APPEARANCE UR: CLEAR
AST SERPL W P-5'-P-CCNC: 12 U/L (ref 0–45)
BASOPHILS # BLD AUTO: 0.1 10E3/UL (ref 0–0.2)
BASOPHILS NFR BLD AUTO: 1 %
BILIRUB SERPL-MCNC: 0.4 MG/DL (ref 0.2–1.3)
BILIRUB UR QL STRIP: NEGATIVE
BUN SERPL-MCNC: 9 MG/DL (ref 7–30)
CALCIUM SERPL-MCNC: 9.6 MG/DL (ref 8.5–10.1)
CHLORIDE BLD-SCNC: 105 MMOL/L (ref 94–109)
CLUE CELLS: ABNORMAL
CO2 SERPL-SCNC: 27 MMOL/L (ref 20–32)
COLOR UR AUTO: YELLOW
CREAT SERPL-MCNC: 0.69 MG/DL (ref 0.52–1.04)
EOSINOPHIL # BLD AUTO: 0.2 10E3/UL (ref 0–0.7)
EOSINOPHIL NFR BLD AUTO: 3 %
ERYTHROCYTE [DISTWIDTH] IN BLOOD BY AUTOMATED COUNT: 13.7 % (ref 10–15)
GFR SERPL CREATININE-BSD FRML MDRD: >90 ML/MIN/1.73M2
GLUCOSE BLD-MCNC: 101 MG/DL (ref 70–99)
GLUCOSE UR STRIP-MCNC: NEGATIVE MG/DL
HCT VFR BLD AUTO: 46.2 % (ref 35–47)
HGB BLD-MCNC: 14.3 G/DL (ref 11.7–15.7)
HGB UR QL STRIP: NEGATIVE
KETONES UR STRIP-MCNC: NEGATIVE MG/DL
LEUKOCYTE ESTERASE UR QL STRIP: NEGATIVE
LYMPHOCYTES # BLD AUTO: 1.2 10E3/UL (ref 0.8–5.3)
LYMPHOCYTES NFR BLD AUTO: 15 %
MCH RBC QN AUTO: 27.9 PG (ref 26.5–33)
MCHC RBC AUTO-ENTMCNC: 31 G/DL (ref 31.5–36.5)
MCV RBC AUTO: 90 FL (ref 78–100)
MONOCYTES # BLD AUTO: 0.5 10E3/UL (ref 0–1.3)
MONOCYTES NFR BLD AUTO: 6 %
NEUTROPHILS # BLD AUTO: 6.1 10E3/UL (ref 1.6–8.3)
NEUTROPHILS NFR BLD AUTO: 76 %
NITRATE UR QL: NEGATIVE
PH UR STRIP: 5.5 [PH] (ref 5–7)
PLATELET # BLD AUTO: 176 10E3/UL (ref 150–450)
POTASSIUM BLD-SCNC: 3.9 MMOL/L (ref 3.4–5.3)
PROT SERPL-MCNC: 7.7 G/DL (ref 6.8–8.8)
RBC # BLD AUTO: 5.13 10E6/UL (ref 3.8–5.2)
SODIUM SERPL-SCNC: 137 MMOL/L (ref 133–144)
SP GR UR STRIP: <=1.005 (ref 1–1.03)
TRICHOMONAS, WET PREP: ABNORMAL
UROBILINOGEN UR STRIP-ACNC: 0.2 E.U./DL
WBC # BLD AUTO: 8.1 10E3/UL (ref 4–11)
WBC'S/HIGH POWER FIELD, WET PREP: ABNORMAL
YEAST, WET PREP: ABNORMAL

## 2022-01-20 PROCEDURE — 81003 URINALYSIS AUTO W/O SCOPE: CPT | Performed by: NURSE PRACTITIONER

## 2022-01-20 PROCEDURE — 99214 OFFICE O/P EST MOD 30 MIN: CPT | Performed by: NURSE PRACTITIONER

## 2022-01-20 PROCEDURE — 36415 COLL VENOUS BLD VENIPUNCTURE: CPT | Performed by: NURSE PRACTITIONER

## 2022-01-20 PROCEDURE — 80053 COMPREHEN METABOLIC PANEL: CPT | Performed by: NURSE PRACTITIONER

## 2022-01-20 PROCEDURE — 87210 SMEAR WET MOUNT SALINE/INK: CPT | Performed by: NURSE PRACTITIONER

## 2022-01-20 PROCEDURE — 85025 COMPLETE CBC W/AUTO DIFF WBC: CPT | Performed by: NURSE PRACTITIONER

## 2022-01-20 NOTE — PATIENT INSTRUCTIONS
Drink extra water.    Try taking Azo which is available over-the-counter at the pharmacy to help with the pain.  Be sure to stop this at least 3 days before you see the urologist.    Check blood pressure twice a day for the next 2 weeks and keep a log of this.  If top number is greater than 140 consistently and or bottom number is greater than 90 consistently please make appointment to follow-up with your primary care provider to discuss treatment for high blood pressure.    Patient Education     Dysuria with Uncertain Cause (Adult)    The urethra is the tube that allows urine to pass out of the body. In a woman, the urethra is the opening above the vagina. In men, the urethra is the opening on the tip of the penis. Dysuria is the feeling of pain or burning in the urethra when passing urine.   Dysuria can be caused by anything that irritates or inflames the urethra. An infection or chemical irritation can cause this reaction. A bladder infection is the most common cause of dysuria in adults. A urine test can diagnose this. A bladder infection needs antibiotic treatment.   Soaps, lotions, colognes, and feminine hygiene products can cause dysuria. So can birth control jellies, creams, and foams. It will go away 1 to 3 days after using these irritants.   Sexually transmitted infections (STIs) such as chlamydia or gonorrhea can cause dysuria. Your healthcare provider may take a culture sample. Your provider may start you on antibiotic medicine before the culture test returns.   In women who have gone through menopause, dysuria can be from dryness in the lining of the urethra. This can be treated with hormones. Dysuria becomes long-term (chronic) when it lasts for weeks or months. You may need to see a specialist (urologist) to diagnose and treat chronic dysuria.   Home care  These home care tips may help:    Don't use any chemicals or products that you think may be causing your symptoms.    If you were given a  prescription medicine, take as directed. Take it until it is all used up.    If a culture was taken, don't have sex until you have been told that it is negative. A negative culture means you don't have an infection. Then follow your healthcare provider's advice to treat your condition.  If a culture was done and it is positive:     Both you and your sexual partner may need to be treated. This is true even if your partner has no symptoms.    Contact your healthcare provider or go to an urgent care clinic or the public health department to be looked at and treated.    Don't have sex until both you and your partner have finished all antibiotics and your healthcare provider says you are no longer contagious.    Learn about and use safe sex practices. The safest sex is with a partner who has tested negative and only has sex with you. Condoms can prevent STIs from spreading, but they aren't a guarantee.  Follow-up care  Follow up with your healthcare provider, or as advised. If a culture was taken, you may call as directed for the results. If you have an STI, follow up with your provider or the public health department for a complete STI screening, including HIV testing. For more information, contact CDC-INFO at 138-642-4235.   When to get medical advice  Call your healthcare provider right away if any of these occur:    You aren't better after 3 days of treatment    Fever of 100.4 F (38 C) or higher, or as directed by your provider    Back or belly pain that gets worse    You can't urinate because of pain    New discharge from the urethra, vagina, or penis    Painful sores on the penis    Rash or joint pain    Painful lumps (lymph nodes) in the groin    Testicle pain or swelling of the scrotum  [email or web address] last reviewed this educational content on 10/1/2019    0705-0150 [employer]. All rights reserved. This information is not intended as a substitute for professional medical care. Always follow your  healthcare professional's instructions.

## 2022-01-20 NOTE — PROGRESS NOTES
Chief Complaint   Patient presents with     Urgent Care     burning when urinating and urinary frequency one month.         ICD-10-CM    1. Dysuria  R30.0 UA reflex to Microscopic and Culture     Wet prep - Clinic Collect     CBC with platelets and differential     Comprehensive metabolic panel (BMP + Alb, Alk Phos, ALT, AST, Total. Bili, TP)     CBC with platelets and differential     Comprehensive metabolic panel (BMP + Alb, Alk Phos, ALT, AST, Total. Bili, TP)   2. Elevated blood pressure reading without diagnosis of hypertension  R03.0      Keep appointment with urology as scheduled on February 9.  May try Azo to help with dysuria.  Drink plenty of water.  If she develops fever over 100.4, back pain, nausea or vomiting she will return for further evaluation sooner.    Check blood pressure twice a day for the next 2 weeks and keep a log to discuss with PCP.    32 minutes spent on the date of the encounter doing chart review, history and exam, documentation and further activities per the note    Medical Decision Making    Differential Diagnosis:  UTI: UTI, Dysuria, Pyelonephritis, Kidney Stone, Urethritis, Vaginitis, Foreign body, STD and Interstitial Cystitis      Results for orders placed or performed in visit on 01/20/22 (from the past 24 hour(s))   UA reflex to Microscopic and Culture    Specimen: Urine, Midstream   Result Value Ref Range    Color Urine Yellow Colorless, Straw, Light Yellow, Yellow    Appearance Urine Clear Clear    Glucose Urine Negative Negative mg/dL    Bilirubin Urine Negative Negative    Ketones Urine Negative Negative mg/dL    Specific Gravity Urine <=1.005 1.003 - 1.035    Blood Urine Negative Negative    pH Urine 5.5 5.0 - 7.0    Protein Albumin Urine Negative Negative mg/dL    Urobilinogen Urine 0.2 0.2, 1.0 E.U./dL    Nitrite Urine Negative Negative    Leukocyte Esterase Urine Negative Negative    Narrative    Microscopic not indicated   Wet prep - Clinic Collect    Specimen: Vagina;  Swab   Result Value Ref Range    Trichomonas Absent Absent    Yeast Absent Absent    Clue Cells Absent Absent    WBCs/high power field 2+ (A) None   CBC with platelets and differential    Narrative    The following orders were created for panel order CBC with platelets and differential.  Procedure                               Abnormality         Status                     ---------                               -----------         ------                     CBC with platelets and d...[[id number]]  Abnormal            Final result                 Please view results for these tests on the individual orders.   Comprehensive metabolic panel (BMP + Alb, Alk Phos, ALT, AST, Total. Bili, TP)   Result Value Ref Range    Sodium 137 133 - 144 mmol/L    Potassium 3.9 3.4 - 5.3 mmol/L    Chloride 105 94 - 109 mmol/L    Carbon Dioxide (CO2) 27 20 - 32 mmol/L    Anion Gap 5 3 - 14 mmol/L    Urea Nitrogen 9 7 - 30 mg/dL    Creatinine 0.69 0.52 - 1.04 mg/dL    Calcium 9.6 8.5 - 10.1 mg/dL    Glucose 101 (H) 70 - 99 mg/dL    Alkaline Phosphatase 135 40 - 150 U/L    AST 12 0 - 45 U/L    ALT 27 0 - 50 U/L    Protein Total 7.7 6.8 - 8.8 g/dL    Albumin 3.8 3.4 - 5.0 g/dL    Bilirubin Total 0.4 0.2 - 1.3 mg/dL    GFR Estimate >90 >60 mL/min/1.73m2   CBC with platelets and differential   Result Value Ref Range    WBC Count 8.1 4.0 - 11.0 10e3/uL    RBC Count 5.13 3.80 - 5.20 10e6/uL    Hemoglobin 14.3 11.7 - 15.7 g/dL    Hematocrit 46.2 35.0 - 47.0 %    MCV 90 78 - 100 fL    MCH 27.9 26.5 - 33.0 pg    MCHC 31.0 (L) 31.5 - 36.5 g/dL    RDW 13.7 10.0 - 15.0 %    Platelet Count 176 150 - 450 10e3/uL    % Neutrophils 76 %    % Lymphocytes 15 %    % Monocytes 6 %    % Eosinophils 3 %    % Basophils 1 %    Absolute Neutrophils 6.1 1.6 - 8.3 10e3/uL    Absolute Lymphocytes 1.2 0.8 - 5.3 10e3/uL    Absolute Monocytes 0.5 0.0 - 1.3 10e3/uL    Absolute Eosinophils 0.2 0.0 - 0.7 10e3/uL    Absolute Basophils 0.1 0.0 - 0.2 10e3/uL       Subjective      Tyra Marsh is an 66 year old female who presents to clinic today for dysuria, lower abdominal pressure for 3-4 weeks. Took Bactrim for 3 days just after [place] for presumed UTI but culture came back negative. Symptoms improved but never went away completely and now have worsened.    ROS: 10 point ROS neg other than the symptoms noted above in the HPI.       Objective    BP (!) 152/92 (BP Location: Left arm, Patient Position: Sitting, Cuff Size: Adult Large)   Pulse 111   Temp 97.9  F (36.6  C) (Tympanic)   Nurses notes and VS have been reviewed.    Physical Exam       GENERAL APPEARANCE: healthy appearing, alert     ABDOMEN:  soft, nontender, no HSM or masses and bowel sounds normal, no CVA tenderness to recheck pressure just with a manual cuff on     MS: extremities normal- no gross deformities noted; normal muscle tone.     SKIN: no suspicious lesions or rashes     PSYCH: normal thought process; no significant mood disturbance    Patient Instructions   Drink extra water.    Try taking Azo which is available over-the-counter at the pharmacy to help with the pain.  Be sure to stop this at least 3 days before you see the urologist.    Check blood pressure twice a day for the next 2 weeks and keep a log of this.  If top number is greater than 140 consistently and or bottom number is greater than 90 consistently please make appointment to follow-up with your primary care provider to discuss treatment for high blood pressure.    Patient Education     Dysuria with Uncertain Cause (Adult)    The urethra is the tube that allows urine to pass out of the body. In a woman, the urethra is the opening above the vagina. In men, the urethra is the opening on the tip of the penis. Dysuria is the feeling of pain or burning in the urethra when passing urine.   Dysuria can be caused by anything that irritates or inflames the urethra. An infection or chemical irritation can cause this reaction. A bladder infection is  the most common cause of dysuria in adults. A urine test can diagnose this. A bladder infection needs antibiotic treatment.   Soaps, lotions, colognes, and feminine hygiene products can cause dysuria. So can birth control jellies, creams, and foams. It will go away 1 to 3 days after using these irritants.   Sexually transmitted infections (STIs) such as chlamydia or gonorrhea can cause dysuria. Your healthcare provider may take a culture sample. Your provider may start you on antibiotic medicine before the culture test returns.   In women who have gone through menopause, dysuria can be from dryness in the lining of the urethra. This can be treated with hormones. Dysuria becomes long-term (chronic) when it lasts for weeks or months. You may need to see a specialist (urologist) to diagnose and treat chronic dysuria.   Home care  These home care tips may help:    Don't use any chemicals or products that you think may be causing your symptoms.    If you were given a prescription medicine, take as directed. Take it until it is all used up.    If a culture was taken, don't have sex until you have been told that it is negative. A negative culture means you don't have an infection. Then follow your healthcare provider's advice to treat your condition.  If a culture was done and it is positive:     Both you and your sexual partner may need to be treated. This is true even if your partner has no symptoms.    Contact your healthcare provider or go to an urgent care clinic or the public health department to be looked at and treated.    Don't have sex until both you and your partner have finished all antibiotics and your healthcare provider says you are no longer contagious.    Learn about and use safe sex practices. The safest sex is with a partner who has tested negative and only has sex with you. Condoms can prevent STIs from spreading, but they aren't a guarantee.  Follow-up care  Follow up with your healthcare provider,  or as advised. If a culture was taken, you may call as directed for the results. If you have an STI, follow up with your provider or the public health department for a complete STI screening, including HIV testing. For more information, contact CDC-INFO at 052-941-2832.   When to get medical advice  Call your healthcare provider right away if any of these occur:    You aren't better after 3 days of treatment    Fever of 100.4 F (38 C) or higher, or as directed by your provider    Back or belly pain that gets worse    You can't urinate because of pain    New discharge from the urethra, vagina, or penis    Painful sores on the penis    Rash or joint pain    Painful lumps (lymph nodes) in the groin    Testicle pain or swelling of the scrotum  [email or web address] last reviewed this educational content on 10/1/2019    2360-3752 [employer]. All rights reserved. This information is not intended as a substitute for professional medical care. Always follow your healthcare professional's instructions.               LUBNA He, [facility] Urgent Care Provider    The use of [employer] dictation services may have been used to construct the content in this note; any grammatical or spelling errors are non-intentional. Please contact the author of this note directly if you are in need of any clarification.

## 2022-02-09 ENCOUNTER — TRANSFERRED RECORDS (OUTPATIENT)
Dept: HEALTH INFORMATION MANAGEMENT | Facility: CLINIC | Age: 67
End: 2022-02-09
Payer: COMMERCIAL

## 2022-05-25 ENCOUNTER — PATIENT OUTREACH (OUTPATIENT)
Dept: FAMILY MEDICINE | Facility: CLINIC | Age: 67
End: 2022-05-25
Payer: COMMERCIAL

## 2022-05-25 NOTE — TELEPHONE ENCOUNTER
Needs of attention regarding:  -Colon Cancer Screening  -Wellness (Physical) Visit     Health Maintenance Topics with due status: Overdue       Topic Date Due    ANNUAL REVIEW OF HM ORDERS Never done    ADVANCE CARE PLANNING Never done    HIV SCREENING Never done    HEPATITIS C SCREENING Never done    DTAP/TDAP/TD IMMUNIZATION Never done    ZOSTER IMMUNIZATION Never done    LIPID 01/07/2019    COLORECTAL CANCER SCREENING 03/23/2019    PHQ-2 (once per calendar year) 01/01/2022    MEDICARE ANNUAL WELLNESS VISIT Never done    FALL RISK ASSESSMENT Never done    COVID-19 Vaccine 03/26/2022     Health Maintenance Topics with due status: Due On       Topic Date Due    Pneumococcal Vaccine: 65+ Years 02/19/2022       Communication:  Patient called - message left

## 2022-07-13 ENCOUNTER — PATIENT OUTREACH (OUTPATIENT)
Dept: FAMILY MEDICINE | Facility: CLINIC | Age: 67
End: 2022-07-13

## 2022-08-10 ENCOUNTER — HOSPITAL ENCOUNTER (OUTPATIENT)
Dept: BONE DENSITY | Facility: CLINIC | Age: 67
Discharge: HOME | End: 2022-08-10
Attending: INTERNAL MEDICINE | Admitting: INTERNAL MEDICINE
Payer: MEDICARE

## 2022-08-10 DIAGNOSIS — C50.312 MALIGNANT NEOPLASM OF LOWER-INNER QUADRANT OF LEFT FEMALE BREAST (H): ICD-10-CM

## 2022-08-10 DIAGNOSIS — Z79.811 LONG TERM (CURRENT) USE OF AROMATASE INHIBITORS: ICD-10-CM

## 2022-08-10 PROCEDURE — 77080 DXA BONE DENSITY AXIAL: CPT

## 2022-08-15 ENCOUNTER — TRANSFERRED RECORDS (OUTPATIENT)
Dept: HEALTH INFORMATION MANAGEMENT | Facility: CLINIC | Age: 67
End: 2022-08-15

## 2022-09-21 ENCOUNTER — TELEPHONE (OUTPATIENT)
Dept: GASTROENTEROLOGY | Facility: CLINIC | Age: 67
End: 2022-09-21

## 2022-09-21 NOTE — TELEPHONE ENCOUNTER
- SCHEDULING DETAILS -     KALPESH  Surgeon    12/12  Date of Procedure  Lower Endoscopy [Colonoscopy]  Type of Procedure Scheduled   RH Location  Which Colonoscopy Prep was Sent?     MOD Sedation Type     N PAC / Pre-op Required         Additional comments:

## 2022-11-15 ENCOUNTER — TRANSFERRED RECORDS (OUTPATIENT)
Dept: HEALTH INFORMATION MANAGEMENT | Facility: CLINIC | Age: 67
End: 2022-11-15

## 2022-12-07 RX ORDER — CLOBETASOL PROPIONATE 0.5 MG/G
OINTMENT TOPICAL WEEKLY
COMMUNITY

## 2022-12-12 ENCOUNTER — HOSPITAL ENCOUNTER (OUTPATIENT)
Facility: CLINIC | Age: 67
Discharge: HOME | End: 2022-12-12
Attending: COLON & RECTAL SURGERY | Admitting: COLON & RECTAL SURGERY
Payer: MEDICARE

## 2022-12-12 VITALS
SYSTOLIC BLOOD PRESSURE: 121 MMHG | TEMPERATURE: 98.1 F | WEIGHT: 193 LBS | DIASTOLIC BLOOD PRESSURE: 85 MMHG | BODY MASS INDEX: 31.02 KG/M2 | HEIGHT: 66 IN | HEART RATE: 80 BPM | OXYGEN SATURATION: 97 % | RESPIRATION RATE: 16 BRPM

## 2022-12-12 LAB — COLONOSCOPY: NORMAL

## 2022-12-12 PROCEDURE — 45378 DIAGNOSTIC COLONOSCOPY: CPT | Performed by: COLON & RECTAL SURGERY

## 2022-12-12 PROCEDURE — 250N000011 HC RX IP 250 OP 636: Performed by: COLON & RECTAL SURGERY

## 2022-12-12 PROCEDURE — G0121 COLON CA SCRN NOT HI RSK IND: HCPCS | Performed by: COLON & RECTAL SURGERY

## 2022-12-12 PROCEDURE — G0500 MOD SEDAT ENDO SERVICE >5YRS: HCPCS | Performed by: COLON & RECTAL SURGERY

## 2022-12-12 RX ORDER — NALOXONE HYDROCHLORIDE 0.4 MG/ML
0.2 INJECTION, SOLUTION INTRAMUSCULAR; INTRAVENOUS; SUBCUTANEOUS
Status: DISCONTINUED | OUTPATIENT
Start: 2022-12-12 | End: 2022-12-12 | Stop reason: HOSPADM

## 2022-12-12 RX ORDER — NALOXONE HYDROCHLORIDE 0.4 MG/ML
0.4 INJECTION, SOLUTION INTRAMUSCULAR; INTRAVENOUS; SUBCUTANEOUS
Status: DISCONTINUED | OUTPATIENT
Start: 2022-12-12 | End: 2022-12-12 | Stop reason: HOSPADM

## 2022-12-12 RX ORDER — ATROPINE SULFATE 0.1 MG/ML
1 INJECTION INTRAVENOUS
Status: DISCONTINUED | OUTPATIENT
Start: 2022-12-12 | End: 2022-12-12 | Stop reason: HOSPADM

## 2022-12-12 RX ORDER — FENTANYL CITRATE 0.05 MG/ML
50-100 INJECTION, SOLUTION INTRAMUSCULAR; INTRAVENOUS EVERY 5 MIN PRN
Status: DISCONTINUED | OUTPATIENT
Start: 2022-12-12 | End: 2022-12-12 | Stop reason: HOSPADM

## 2022-12-12 RX ORDER — ONDANSETRON 2 MG/ML
4 INJECTION INTRAMUSCULAR; INTRAVENOUS EVERY 6 HOURS PRN
Status: DISCONTINUED | OUTPATIENT
Start: 2022-12-12 | End: 2022-12-12 | Stop reason: HOSPADM

## 2022-12-12 RX ORDER — SIMETHICONE 40MG/0.6ML
133 SUSPENSION, DROPS(FINAL DOSAGE FORM)(ML) ORAL
Status: DISCONTINUED | OUTPATIENT
Start: 2022-12-12 | End: 2022-12-12 | Stop reason: HOSPADM

## 2022-12-12 RX ORDER — PROCHLORPERAZINE MALEATE 5 MG
5 TABLET ORAL EVERY 6 HOURS PRN
Status: DISCONTINUED | OUTPATIENT
Start: 2022-12-12 | End: 2022-12-12 | Stop reason: HOSPADM

## 2022-12-12 RX ORDER — LIDOCAINE 40 MG/G
CREAM TOPICAL
Status: DISCONTINUED | OUTPATIENT
Start: 2022-12-12 | End: 2022-12-12 | Stop reason: HOSPADM

## 2022-12-12 RX ORDER — FLUMAZENIL 0.1 MG/ML
0.2 INJECTION, SOLUTION INTRAVENOUS
Status: DISCONTINUED | OUTPATIENT
Start: 2022-12-12 | End: 2022-12-12 | Stop reason: HOSPADM

## 2022-12-12 RX ORDER — ONDANSETRON 2 MG/ML
4 INJECTION INTRAMUSCULAR; INTRAVENOUS
Status: DISCONTINUED | OUTPATIENT
Start: 2022-12-12 | End: 2022-12-12 | Stop reason: HOSPADM

## 2022-12-12 RX ORDER — ONDANSETRON 4 MG/1
4 TABLET, ORALLY DISINTEGRATING ORAL EVERY 6 HOURS PRN
Status: DISCONTINUED | OUTPATIENT
Start: 2022-12-12 | End: 2022-12-12 | Stop reason: HOSPADM

## 2022-12-12 RX ORDER — DIPHENHYDRAMINE HYDROCHLORIDE 50 MG/ML
25-50 INJECTION INTRAMUSCULAR; INTRAVENOUS
Status: DISCONTINUED | OUTPATIENT
Start: 2022-12-12 | End: 2022-12-12 | Stop reason: HOSPADM

## 2022-12-12 RX ORDER — EPINEPHRINE 1 MG/ML
0.1 INJECTION, SOLUTION INTRAMUSCULAR; SUBCUTANEOUS
Status: DISCONTINUED | OUTPATIENT
Start: 2022-12-12 | End: 2022-12-12 | Stop reason: HOSPADM

## 2022-12-12 RX ADMIN — MIDAZOLAM HYDROCHLORIDE 2 MG: 1 INJECTION, SOLUTION INTRAMUSCULAR; INTRAVENOUS at 11:18

## 2022-12-12 RX ADMIN — MIDAZOLAM HYDROCHLORIDE 2 MG: 1 INJECTION, SOLUTION INTRAMUSCULAR; INTRAVENOUS at 11:11

## 2022-12-12 RX ADMIN — FENTANYL CITRATE 50 MCG: 0.05 INJECTION, SOLUTION INTRAMUSCULAR; INTRAVENOUS at 11:16

## 2022-12-12 RX ADMIN — FENTANYL CITRATE 100 MCG: 0.05 INJECTION, SOLUTION INTRAMUSCULAR; INTRAVENOUS at 11:13

## 2022-12-12 RX ADMIN — FENTANYL CITRATE 50 MCG: 0.05 INJECTION, SOLUTION INTRAMUSCULAR; INTRAVENOUS at 11:21

## 2022-12-12 ASSESSMENT — ACTIVITIES OF DAILY LIVING (ADL): ADLS_ACUITY_SCORE: 35

## 2022-12-12 NOTE — DISCHARGE INSTRUCTIONS

## 2022-12-12 NOTE — H&P
Pre-Endoscopy History and Physical     Tyra Marsh MRN# [id number]   YOB: 1955 Age: 67 year old     Date of Procedure: 12/12/2022  Primary care provider: Ar Amor  Type of Endoscopy: colonoscopy  Reason for Procedure: screening  Type of Anesthesia Anticipated: Moderate Sedation    HPI:    Tyra is a 67 year old female who will be undergoing the above procedure.      A history and physical has been performed. The patient's medications and allergies have been reviewed. The risks and benefits of the procedure and the sedation options and risks were discussed with the patient.  All questions were answered and informed consent was obtained.      She denies a personal or family history of anesthesia complications or bleeding disorders.     No Known Allergies     Prior to Admission Medications   Prescriptions Last Dose Informant Patient Reported? Taking?   Bioflavonoid Products (VITAMIN C) CHEW   Yes Yes   Sig: Take by mouth daily   Coenzyme Q10 (COQ10) 100 MG CAPS   Yes No   Sig: Take 2 capsules by mouth daily   LevOCARNitine (L-CARNITINE) 500 MG TABS Not Taking  Yes No   Sig: Take by mouth daily    Patient not taking: Reported on 12/7/2022   SYNTHROID 112 MCG tablet 12/11/2022  Yes Yes   Sig: Take 100 mcg by mouth daily   UNABLE TO FIND 12/11/2022  Yes Yes   Sig: Green tea complex   calcium-vitamin D (CALTRATE) 600-400 MG-UNIT per tablet   Yes Yes   Sig: Take 1 tablet by mouth daily    clobetasol (TEMOVATE) 0.05 % external ointment   Yes Yes   Sig: Apply topically once a week 3times/week   letrozole (FEMARA) 2.5 MG tablet 12/11/2022  Yes Yes   Sig: Take 2.5 mg by mouth daily   rosuvastatin (CRESTOR) 20 MG tablet 12/11/2022  Yes Yes   Sig: Take by mouth daily      Facility-Administered Medications: None       Patient Active Problem List   Diagnosis     Hypothyroidism     Hyperlipidemia LDL goal <130     Unspecified adverse effect of unspecified drug, medicinal and biological substance      Adverse effect due to correct medicinal substance, properly administered     Malignant neoplasm of female breast, unspecified laterality, unspecified site of breast        Past Medical History:   Diagnosis Date     Breast cancer (H)     Right ( Lumpectomy, Radiation and Chemotherapy )     Breast cancer (H)     Left ( Lumpectomy, Radiation and Chemotherapy)     Difficult intubation      Hyperlipidaemia      Hypertension      PONV (postoperative nausea and vomiting)      Skin cancer     removed from  nose     Thyroid disease      Unspecified adverse effect of unspecified drug, medicinal and biological substance         Past Surgical History:   Procedure Laterality Date     BREAST SURGERY  ,     Lumpectomies     DILATION AND CURETTAGE, OPERATIVE HYSTEROSCOPY WITH MORCELLATOR, COMBINED N/A 1/15/2016    Procedure: COMBINED DILATION AND CURETTAGE, OPERATIVE HYSTEROSCOPY WITH MORCELLATOR;  Surgeon: Leslee Pack MD;  [facility]:  [facility]     INSERT PORT VASCULAR ACCESS N/A 2014    Procedure: INSERT PORT VASCULAR ACCESS;  Surgeon: Heena Guerra MD;  Location: [facility] OR     MASTECTOMY SIMPLE BILATERAL, SENTINEL NODE BILATERAL, COMBINED Bilateral 2014    Procedure: COMBINED MASTECTOMY SIMPLE BILATERAL, SENTINEL NODE BILATERAL;  Surgeon: Heena Guerra MD;  Location:  OR     REMOVE PORT VASCULAR ACCESS N/A 2016    Procedure: REMOVE PORT VASCULAR ACCESS;  Surgeon: Leonard Bella MD;  Location:  OR       Social History     Tobacco Use     Smoking status: Never     Smokeless tobacco: Never   Substance Use Topics     Alcohol use: Yes     Alcohol/week: 0.0 standard drinks     Comment: occ. glass wine       Family History   Problem Relation Age of Onset     Thyroid Disease Mother      Heart Disease Mother      Cerebrovascular Disease Mother         TIA     C.A.D. Father          age 52     Genitourinary Problems Father         kidney disease     Kidney Disease Father   "    Thyroid Disease Sister      Gallbladder Disease Sister      Colon Cancer No family hx of        REVIEW OF SYSTEMS:     5 point ROS negative except as noted above in HPI, including Gen., Resp., CV, GI &  system review.      PHYSICAL EXAM:   BP (!) 152/109   Pulse 92   Temp 98.1  F (36.7  C) (Temporal)   Resp 16   Ht 1.676 m (5' 6\")   Wt 87.5 kg (193 lb)   SpO2 98%   BMI 31.15 kg/m   Estimated body mass index is 31.15 kg/m  as calculated from the following:    Height as of this encounter: 1.676 m (5' 6\").    Weight as of this encounter: 87.5 kg (193 lb).   GENERAL APPEARANCE: healthy and alert  MENTAL STATUS: alert  AIRWAY EXAM: Mallampatti Class II (visualization of the soft palate, fauces, and uvula)  RESP: lungs clear to auscultation - no rales, rhonchi or wheezes  CV: regular rates and rhythm      DIAGNOSTICS:    Not indicated      IMPRESSION   ASA Class 2 - Mild systemic disease        PLAN:       Plan for colonoscopy. We discussed the risks, benefits and alternatives and the patient wished to proceed.    The above has been forwarded to the consulting provider.      Signed Electronically by: Jade Ho MD, MD  December 12, 2022    "

## 2023-02-14 ENCOUNTER — TRANSFERRED RECORDS (OUTPATIENT)
Dept: HEALTH INFORMATION MANAGEMENT | Facility: CLINIC | Age: 68
End: 2023-02-14

## 2023-02-21 ENCOUNTER — TRANSFERRED RECORDS (OUTPATIENT)
Dept: HEALTH INFORMATION MANAGEMENT | Facility: CLINIC | Age: 68
End: 2023-02-21
Payer: MEDICARE

## 2023-02-27 ENCOUNTER — NURSE TRIAGE (OUTPATIENT)
Dept: FAMILY MEDICINE | Facility: CLINIC | Age: 68
End: 2023-02-27
Payer: MEDICARE

## 2023-02-27 NOTE — TELEPHONE ENCOUNTER
"Feb 11-19th cough/sore throat, improved on own  Feb 2/24 symptoms back. COVID negative. Worsening cough.   Routing to provider for approval outside of recommended window office today. First opening weds.   Future Appointments 2/27/2023 - 8/26/2023      Date Visit Type Length Department Provider     3/1/2023 10:30 AM OFFICE VISIT 30 min EC FP/IM/Ar Paiz MD    Location Instructions:     [facility] is located at [address] [facility], across the street from [facility]. This is just south of the [address] [facility] exit off of [address]. Free lot parking is available.                      Can we leave a detailed message on this number? YES  Phone number patient can be reached at: Home number on file [phone] (home)    Christina Duque RN  [facility] Triage      1. ONSET: \"When did the cough begin?\"       Last friday  2. SEVERITY: \"How bad is the cough today?\"       Moderate today, didn't sleep 12PM-4AM due to continuous coughing. Cough drops didn't help  3. SPUTUM: \"Describe the color of your sputum\" (none, dry cough; clear, white, yellow, green)      Denies   4. HEMOPTYSIS: \"Are you coughing up any blood?\" If so ask: \"How much?\" (flecks, streaks, tablespoons, etc.)      denies  5. DIFFICULTY BREATHING: \"Are you having difficulty breathing?\" If Yes, ask: \"How bad is it?\" (e.g., mild, moderate, severe)     - MILD: No SOB at rest, mild SOB with walking, speaks normally in sentences, can lie down, no retractions, pulse < 100.     - MODERATE: SOB at rest, SOB with minimal exertion and prefers to sit, cannot lie down flat, speaks in phrases, mild retractions, audible wheezing, pulse 100-120.     - SEVERE: Very SOB at rest, speaks in single words, struggling to breathe, sitting hunched forward, retractions, pulse > 120       Denies, ox 97% RA  6. FEVER: \"Do you have a fever?\" If Yes, ask: \"What is your temperature, how was it measured, and when did " "it start?\"      99.1 PO 2/27/2023    7. CARDIAC HISTORY: \"Do you have any history of heart disease?\" (e.g., heart attack, congestive heart failure)       denies  8. LUNG HISTORY: \"Do you have any history of lung disease?\"  (e.g., pulmonary embolus, asthma, emphysema)      Denies, bronchitis in past doesn't feel the same   9. PE RISK FACTORS: \"Do you have a history of blood clots?\" (or: recent major surgery, recent prolonged travel, bedridden)      denies  10. OTHER SYMPTOMS: \"Do you have any other symptoms?\" (e.g., runny nose, wheezing, chest pain)        Runny nose, wheezing for days: now resolved.   11. PREGNANCY: \"Is there any chance you are pregnant?\" \"When was your last menstrual period?\"        NA   12. TRAVEL: \"Have you traveled out of the country in the last month?\" (e.g., travel history, exposures)        denies    Reason for Disposition    SEVERE coughing spells (e.g., whooping sound after coughing, vomiting after coughing)    Additional Information    Negative: Bluish (or gray) lips or face    Negative: SEVERE difficulty breathing (e.g., struggling for each breath, speaks in single words)    Negative: Rapid onset of cough and has hives    Negative: Coughing started suddenly after medicine, an allergic food or bee sting    Negative: Difficulty breathing after exposure to flames, smoke, or fumes    Negative: Sounds like a life-threatening emergency to the triager    Negative: Previous asthma attacks and this feels like asthma attack    Negative: Dry cough (non-productive; no sputum or minimal clear sputum) and within 14 days of COVID-19 Exposure    Negative: MODERATE difficulty breathing (e.g., speaks in phrases, SOB even at rest, pulse 100-120) and still present when not coughing    Negative: Chest pain present when not coughing    Negative: Passed out (i.e., fainted, collapsed and was not responding)    Negative: Patient sounds very sick or weak to the triager    Negative: MILD difficulty breathing (e.g., " minimal/no SOB at rest, SOB with walking, pulse <100) and still present when not coughing    Negative: Coughed up > 1 tablespoon (15 ml) blood (Exception: Blood-tinged sputum.)    Negative: Fever > 103 F (39.4 C)    Negative: Fever > 101 F (38.3 C) and over 60 years of age    Negative: Fever > 100.0 F (37.8 C) and has diabetes mellitus or a weak immune system (e.g., HIV positive, cancer chemotherapy, organ transplant, splenectomy, chronic steroids)    Negative: Fever > 100.0 F (37.8 C) and bedridden (e.g., nursing home patient, stroke, chronic illness, recovering from surgery)    Negative: Increasing ankle swelling    Negative: Wheezing is present    Protocols used: COUGH-A-OH

## 2023-03-01 ENCOUNTER — OFFICE VISIT (OUTPATIENT)
Dept: FAMILY MEDICINE | Facility: CLINIC | Age: 68
End: 2023-03-01
Payer: MEDICARE

## 2023-03-01 ENCOUNTER — ANCILLARY PROCEDURE (OUTPATIENT)
Dept: GENERAL RADIOLOGY | Facility: CLINIC | Age: 68
End: 2023-03-01
Attending: FAMILY MEDICINE
Payer: MEDICARE

## 2023-03-01 VITALS
HEART RATE: 103 BPM | TEMPERATURE: 98.6 F | DIASTOLIC BLOOD PRESSURE: 87 MMHG | OXYGEN SATURATION: 95 % | SYSTOLIC BLOOD PRESSURE: 138 MMHG

## 2023-03-01 DIAGNOSIS — J20.9 ACUTE BRONCHITIS WITH SYMPTOMS > 10 DAYS: ICD-10-CM

## 2023-03-01 DIAGNOSIS — C50.919 MALIGNANT NEOPLASM OF FEMALE BREAST, UNSPECIFIED ESTROGEN RECEPTOR STATUS, UNSPECIFIED LATERALITY, UNSPECIFIED SITE OF BREAST (H): ICD-10-CM

## 2023-03-01 DIAGNOSIS — J18.9 COMMUNITY ACQUIRED PNEUMONIA OF RIGHT MIDDLE LOBE OF LUNG: ICD-10-CM

## 2023-03-01 DIAGNOSIS — E03.9 HYPOTHYROIDISM, UNSPECIFIED TYPE: ICD-10-CM

## 2023-03-01 PROCEDURE — 99214 OFFICE O/P EST MOD 30 MIN: CPT | Performed by: FAMILY MEDICINE

## 2023-03-01 PROCEDURE — 71046 X-RAY EXAM CHEST 2 VIEWS: CPT | Mod: TC | Performed by: RADIOLOGY

## 2023-03-01 RX ORDER — AZITHROMYCIN 250 MG/1
TABLET, FILM COATED ORAL
Qty: 6 TABLET | Refills: 0 | Status: SHIPPED | OUTPATIENT
Start: 2023-03-01 | End: 2023-03-06

## 2023-03-01 RX ORDER — CODEINE PHOSPHATE AND GUAIFENESIN 10; 100 MG/5ML; MG/5ML
1-2 SOLUTION ORAL EVERY 6 HOURS PRN
Qty: 250 ML | Refills: 0 | Status: SHIPPED | OUTPATIENT
Start: 2023-03-01 | End: 2023-11-21

## 2023-03-01 ASSESSMENT — PAIN SCALES - GENERAL: PAINLEVEL: NO PAIN (0)

## 2023-03-01 NOTE — PROGRESS NOTES
"  Assessment & Plan     Hypothyroidism, unspecified type  Stable, keep monitoring     Acute bronchitis with symptoms > 10 days  worsening with SOB and cough ,   Will have her to try abx with cough med   cxr showed sign of right middle love infiltration   - azithromycin (ZITHROMAX) 250 MG tablet; Take 2 tablets (500 mg) by mouth daily for 1 day, THEN 1 tablet (250 mg) daily for 4 days.  - guaiFENesin-codeine (ROBITUSSIN AC) 100-10 MG/5ML solution; Take 5-10 mLs by mouth every 6 hours as needed for cough  - XR Chest 2 Views; Future    Malignant neoplasm of female breast, unspecified estrogen receptor status, unspecified laterality, unspecified site of breast (H)  Seeing oncology   Will keep monitoring       (J18.9) Community acquired pneumonia of right middle lobe of lung  Comment: xr showed sign of pneumonia, pt was informed   Plan: azithromycin (ZITHROMAX) 250 MG tablet,         guaiFENesin-codeine (ROBITUSSIN AC) 100-10         MG/5ML solution, XR Chest 2 Views                     BMI:   Estimated body mass index is 31.15 kg/m  as calculated from the following:    Height as of 12/12/22: 1.676 m (5' 6\").    Weight as of 12/12/22: 87.5 kg (193 lb).   Weight management plan: Discussed healthy diet and exercise guidelines    FUTURE APPOINTMENTS:       - Follow-up visit in 6 months for CPE    No follow-ups on file.    Ar Amor MD  [facility] is a 68 year old presenting for the following health issues:  No chief complaint on file.      History of Present Illness       Reason for visit:  Persistent cough and low grade fever  Symptom onset:  3-4 weeks ago  Symptoms include:  Coughing so much that it interferes with sleep and ribs are sore.  Low grade fever  Symptom intensity:  Severe  Symptom progression:  Worsening  Had these symptoms before:  Yes  What makes it worse:  No  What makes it better:  Tylenol, cough drops, Robitussin    She eats 2-3 servings of fruits and " vegetables daily.She consumes 0 sweetened beverage(s) daily.She exercises with enough effort to increase her heart rate 9 or less minutes per day.  She exercises with enough effort to increase her heart rate 3 or less days per week.   She is taking medications regularly.       Acute Illness  Acute illness concerns: cough    Onset/Duration: 3 weeks   Symptoms:  Fever: No  Chills/Sweats: YES  Headache (location?): YES  Sinus Pressure: YES  Conjunctivitis:  No  Ear Pain: no  Rhinorrhea: YES  Congestion: YES  Sore Throat: No  Cough: YES  Wheeze: YES  Decreased Appetite: No  Nausea: No  Vomiting: No  Diarrhea: No  Dysuria/Freq.: No  Dysuria or Hematuria: No  Fatigue/Achiness: No  Sick/Strep Exposure: No  Therapies tried and outcome: None      Review of Systems   Constitutional, HEENT, cardiovascular, pulmonary, gi and gu systems are negative, except as otherwise noted.      Objective    /87   Pulse 103   Temp 98.6  F (37  C) (Temporal)   SpO2 95%   There is no height or weight on file to calculate BMI.  Physical Exam   GENERAL: healthy, alert and no distress  NECK: no adenopathy, no asymmetry, masses, or scars and thyroid normal to palpation  RESP: lungs clear to auscultation - no rales, rhonchi or wheezes  CV: regular rate and rhythm, normal S1 S2, no S3 or S4, no murmur, click or rub, no peripheral edema and peripheral pulses strong  ABDOMEN: soft, nontender, no hepatosplenomegaly, no masses and bowel sounds normal  MS: no gross musculoskeletal defects noted, no edema

## 2023-08-22 ENCOUNTER — TRANSFERRED RECORDS (OUTPATIENT)
Dept: HEALTH INFORMATION MANAGEMENT | Facility: CLINIC | Age: 68
End: 2023-08-22
Payer: MEDICARE

## 2023-08-25 ENCOUNTER — TRANSFERRED RECORDS (OUTPATIENT)
Dept: HEALTH INFORMATION MANAGEMENT | Facility: CLINIC | Age: 68
End: 2023-08-25
Payer: MEDICARE

## 2023-11-21 ENCOUNTER — LAB (OUTPATIENT)
Dept: LAB | Facility: CLINIC | Age: 68
End: 2023-11-21
Payer: MEDICARE

## 2023-11-21 ENCOUNTER — OFFICE VISIT (OUTPATIENT)
Dept: ALLERGY | Facility: CLINIC | Age: 68
End: 2023-11-21
Payer: MEDICARE

## 2023-11-21 VITALS
WEIGHT: 172 LBS | HEART RATE: 99 BPM | BODY MASS INDEX: 27.76 KG/M2 | SYSTOLIC BLOOD PRESSURE: 140 MMHG | DIASTOLIC BLOOD PRESSURE: 90 MMHG | OXYGEN SATURATION: 97 %

## 2023-11-21 DIAGNOSIS — T78.40XA ALLERGY, UNSPECIFIED, INITIAL ENCOUNTER: ICD-10-CM

## 2023-11-21 DIAGNOSIS — H57.9 PRURITUS OF BOTH EYES: Primary | ICD-10-CM

## 2023-11-21 DIAGNOSIS — L30.9 DERMATITIS: ICD-10-CM

## 2023-11-21 DIAGNOSIS — H57.9 PRURITUS OF BOTH EYES: ICD-10-CM

## 2023-11-21 PROCEDURE — 99204 OFFICE O/P NEW MOD 45 MIN: CPT | Performed by: INTERNAL MEDICINE

## 2023-11-21 PROCEDURE — 86003 ALLG SPEC IGE CRUDE XTRC EA: CPT

## 2023-11-21 PROCEDURE — 36415 COLL VENOUS BLD VENIPUNCTURE: CPT

## 2023-11-21 RX ORDER — HYDROCORTISONE 25 MG/G
OINTMENT TOPICAL 2 TIMES DAILY
Qty: 30 G | Refills: 1 | Status: SHIPPED | OUTPATIENT
Start: 2023-11-21

## 2023-11-21 RX ORDER — LEVOTHYROXINE SODIUM 88 UG/1
88 TABLET ORAL DAILY
COMMUNITY

## 2023-11-21 ASSESSMENT — ENCOUNTER SYMPTOMS
NAUSEA: 0
RHINORRHEA: 0
SINUS PRESSURE: 0
COUGH: 0
JOINT SWELLING: 0
HEADACHES: 0
EYE DISCHARGE: 0
DIARRHEA: 0
CHILLS: 0
EYE REDNESS: 0
CHEST TIGHTNESS: 0
MYALGIAS: 0
SHORTNESS OF BREATH: 0
WHEEZING: 0
FACIAL SWELLING: 0
ADENOPATHY: 0
ARTHRALGIAS: 0
ACTIVITY CHANGE: 0
EYE ITCHING: 1
VOMITING: 0
FEVER: 0

## 2023-11-21 NOTE — PATIENT INSTRUCTIONS
Zaditor eyedrops, 1 drop each eye twice daily (hold off for now)  Hydrocortisone apply to eyelids at night for 3 days, then as needed for eyelid redness  Zyrtec 10 mg at night x 1 week, as needed for itching  Will check labs        Allergy Staff Appt Hours Shot Hours Location       Physician   Sathish Castillo MD      Support Staff   DACIA Rey, DACIA CURTIS, KENTON CADENA MA      Mondays Tuesdays Thursdays and Fridays:      Megan 7-5 Wednesdays         Close                Mondays, Tuesdays and Fridays:  7:20 - 3:40              [facility]  [address]  [place] [address]  Allergy appointment  line: [phone]    Pulmonary Function Scheduling:  [place]: [phone]

## 2023-11-21 NOTE — LETTER
2023         RE: Tyra Marsh  [address] [place] [address]        Dear Colleague,    Thank you for referring your patient, Tyra Marsh, to the [facility] [place]. Please see a copy of my visit note below.    Tyra Marsh was seen in the Allergy Clinic at [facility].    Tyra Marsh is a 68 year old female being seen today in consultation for itchy eyes.  This been a problem for 3 to 4 weeks.  It is intermittent occurring 2-3 times a week.  She will have red and swollen eyelids bilaterally both upper and lower.  Today the left upper eyelid is swollen.  She does have 2 cats that have been with her for 10 years and she has had cats for over 30 years.    She cannot think of anything new except for maybe a sponge applicator that she was recently obtained, however the brand is similar to previous.  No change in other topical products or detergents.    She did take Benadryl last night.    The itching of the eyes is relatively mild.  She did have some crusting of the eyelid after placing CeraVe a cream on her eyelids 1 evening.      She has never had a similar problem in the past.  She has not started any new oral medications.    Past Medical History:   Diagnosis Date     Breast cancer (H)     Right ( Lumpectomy, Radiation and Chemotherapy )     Breast cancer (H)     Left ( Lumpectomy, Radiation and Chemotherapy)     Difficult intubation      Hyperlipidaemia      Hypertension      PONV (postoperative nausea and vomiting)      Skin cancer     removed from  nose     Thyroid disease      Unspecified adverse effect of unspecified drug, medicinal and biological substance      Family History   Problem Relation Age of Onset     Thyroid Disease Mother      Heart Disease Mother      Cerebrovascular Disease Mother         TIA     C.A.D. Father          age 52     Genitourinary Problems Father         kidney disease     Kidney Disease  Father      Thyroid Disease Sister      Gallbladder Disease Sister      Colon Cancer No family hx of      Past Surgical History:   Procedure Laterality Date     BREAST SURGERY  1999, 2002    Lumpectomies     COLONOSCOPY N/A 12/12/2022    Procedure: COLONOSCOPY;  Surgeon: Jade Ho MD;  Location:  GI     DILATION AND CURETTAGE, OPERATIVE HYSTEROSCOPY WITH MORCELLATOR, COMBINED N/A 1/15/2016    Procedure: COMBINED DILATION AND CURETTAGE, OPERATIVE HYSTEROSCOPY WITH MORCELLATOR;  Surgeon: Leslee Pack MD;  Location:  [facility]     INSERT PORT VASCULAR ACCESS N/A 12/29/2014    Procedure: INSERT PORT VASCULAR ACCESS;  Surgeon: Heena Guerra MD;  Location: [facility] OR     MASTECTOMY SIMPLE BILATERAL, SENTINEL NODE BILATERAL, COMBINED Bilateral 12/29/2014    Procedure: COMBINED MASTECTOMY SIMPLE BILATERAL, SENTINEL NODE BILATERAL;  Surgeon: Heena Guerra MD;  Location: [facility] OR     REMOVE PORT VASCULAR ACCESS N/A 2/17/2016    Procedure: REMOVE PORT VASCULAR ACCESS;  Surgeon: Leonard Bella MD;  Location:  OR       ENVIRONMENTAL HISTORY:   Pets inside the house include 2 cat(s).  Do you smoke cigarettes or other recreational drugs? No There is/are 0 smokers living in the house. The house does not have a damp basement.     SOCIAL HISTORY:   Tyra is retired. She lives with her .      Review of Systems   Constitutional:  Negative for activity change, chills and fever.   HENT:  Negative for congestion, dental problem, ear pain, facial swelling, nosebleeds, postnasal drip, rhinorrhea, sinus pressure and sneezing.    Eyes:  Positive for itching. Negative for discharge and redness.        Positive for swollen eyes   Respiratory:  Negative for cough, chest tightness, shortness of breath and wheezing.    Cardiovascular:  Negative for chest pain.   Gastrointestinal:  Negative for diarrhea, nausea and vomiting.   Musculoskeletal:  Negative for arthralgias, joint swelling and myalgias.    Skin:  Negative for rash.   Neurological:  Negative for headaches.   Hematological:  Negative for adenopathy.   Psychiatric/Behavioral:  Negative for behavioral problems and self-injury.    All other systems reviewed and are negative.        Current Outpatient Medications:      Bioflavonoid Products (VITAMIN C) CHEW, Take by mouth daily, Disp: , Rfl:      calcium-vitamin D (CALTRATE) 600-400 MG-UNIT per tablet, Take 1 tablet by mouth daily , Disp: , Rfl:      clobetasol (TEMOVATE) 0.05 % external ointment, Apply topically once a week 3times/week, Disp: , Rfl:      Coenzyme Q10 (COQ10) 100 MG CAPS, Take 2 capsules by mouth daily, Disp: , Rfl:      hydrocortisone 2.5 % ointment, Apply topically 2 times daily, Disp: 30 g, Rfl: 1     letrozole (FEMARA) 2.5 MG tablet, Take 2.5 mg by mouth daily, Disp: , Rfl:      levothyroxine (SYNTHROID/LEVOTHROID) 88 MCG tablet, Take 88 mcg by mouth daily, Disp: , Rfl:      rosuvastatin (CRESTOR) 20 MG tablet, Take by mouth daily, Disp: , Rfl:      UNABLE TO FIND, Green tea complex, Disp: , Rfl:   No Known Allergies      EXAM:   BP (!) 140/90   Pulse 99   Wt 78 kg (172 lb)   SpO2 97%   BMI 27.76 kg/m      Physical Exam    Constitutional:       General: She is not in acute distress.     Appearance: Normal appearance. She is not ill-appearing.   HENT:      Head: Normocephalic and atraumatic.      Nose: Nose normal. No congestion or rhinorrhea.      Mouth/Throat:      Mouth: Mucous membranes are moist.      Pharynx: Oropharynx is clear. No posterior oropharyngeal erythema.   Eyes:      General:         Right eye: No discharge.  Mild erythema of lower eyelid     Left eye: No discharge. Mild erythema of lower eyelid.  Left upper eyelid is swollen.    Cardiovascular:      Rate and Rhythm: Normal rate and regular rhythm.      Heart sounds: Normal heart sounds.   Pulmonary:      Effort: Pulmonary effort is normal.      Breath sounds: Normal breath sounds. No wheezing or rhonchi.    Skin:     General: Skin is warm.      Findings: No erythema or rash.   Neurological:      General: No focal deficit present.      Mental Status: She is alert. Mental status is at baseline.   Psychiatric:         Mood and Affect: Mood normal.         Behavior: Behavior normal.        ASSESSMENT/PLAN:  Tyra Marsh is a 68 year old female seen today for eye concerns with eye swelling and itching over the last 3 to 4 weeks happening intermittently.  No obvious trigger.  We will check blood test for dust mite and cat.  Also made medication recommendations.  She has concerns about eyedrops due to recent recall of multiple eyedrops.    Zaditor eyedrops, 1 drop each eye twice daily (hold off for now)  Hydrocortisone apply to eyelids at night for 3 days, then as needed for eyelid redness  Zyrtec 10 mg at night x 1 week, as needed for itching  Will check labs    Follow-up in 2 weeks      Thank you for allowing me to participate in the care of Tyra Marsh.      I spent 35 minutes on the date of the encounter doing chart review, history and exam, documentation and further coordination as noted above exclusive of separately reported interpretations    Sathish Castillo MD  Allergy/Immunology  [facility]      Again, thank you for allowing me to participate in the care of your patient.        Sincerely,        Sathish Castillo MD

## 2023-11-21 NOTE — PROGRESS NOTES
Tyra Marsh was seen in the Allergy Clinic at [facility].    Tyra Marsh is a 68 year old female being seen today in consultation for itchy eyes.  This been a problem for 3 to 4 weeks.  It is intermittent occurring 2-3 times a week.  She will have red and swollen eyelids bilaterally both upper and lower.  Today the left upper eyelid is swollen.  She does have 2 cats that have been with her for 10 years and she has had cats for over 30 years.    She cannot think of anything new except for maybe a sponge applicator that she was recently obtained, however the brand is similar to previous.  No change in other topical products or detergents.    She did take Benadryl last night.    The itching of the eyes is relatively mild.  She did have some crusting of the eyelid after placing CeraVe a cream on her eyelids 1 evening.      She has never had a similar problem in the past.  She has not started any new oral medications.    Past Medical History:   Diagnosis Date    Breast cancer (H)     Right ( Lumpectomy, Radiation and Chemotherapy )    Breast cancer (H)     Left ( Lumpectomy, Radiation and Chemotherapy)    Difficult intubation     Hyperlipidaemia     Hypertension     PONV (postoperative nausea and vomiting)     Skin cancer     removed from  nose    Thyroid disease     Unspecified adverse effect of unspecified drug, medicinal and biological substance      Family History   Problem Relation Age of Onset    Thyroid Disease Mother     Heart Disease Mother     Cerebrovascular Disease Mother         TIA    C.A.D. Father          age 52    Genitourinary Problems Father         kidney disease    Kidney Disease Father     Thyroid Disease Sister     Gallbladder Disease Sister     Colon Cancer No family hx of      Past Surgical History:   Procedure Laterality Date    BREAST SURGERY  ,     Lumpectomies    COLONOSCOPY N/A 2022    Procedure: COLONOSCOPY;  Surgeon: Jade Ho  MD Hany;  Location:  GI    DILATION AND CURETTAGE, OPERATIVE HYSTEROSCOPY WITH MORCELLATOR, COMBINED N/A 1/15/2016    Procedure: COMBINED DILATION AND CURETTAGE, OPERATIVE HYSTEROSCOPY WITH MORCELLATOR;  Surgeon: Leslee Pack MD;  Location:  [facility]    INSERT PORT VASCULAR ACCESS N/A 12/29/2014    Procedure: INSERT PORT VASCULAR ACCESS;  Surgeon: Heena Guerra MD;  Location: [facility] OR    MASTECTOMY SIMPLE BILATERAL, SENTINEL NODE BILATERAL, COMBINED Bilateral 12/29/2014    Procedure: COMBINED MASTECTOMY SIMPLE BILATERAL, SENTINEL NODE BILATERAL;  Surgeon: Heena Guerra MD;  Location: [facility] OR    REMOVE PORT VASCULAR ACCESS N/A 2/17/2016    Procedure: REMOVE PORT VASCULAR ACCESS;  Surgeon: Leonard Bella MD;  Location:  OR       ENVIRONMENTAL HISTORY:   Pets inside the house include 2 cat(s).  Do you smoke cigarettes or other recreational drugs? No There is/are 0 smokers living in the house. The house does not have a damp basement.     SOCIAL HISTORY:   Tyra is retired. She lives with her .      Review of Systems   Constitutional:  Negative for activity change, chills and fever.   HENT:  Negative for congestion, dental problem, ear pain, facial swelling, nosebleeds, postnasal drip, rhinorrhea, sinus pressure and sneezing.    Eyes:  Positive for itching. Negative for discharge and redness.        Positive for swollen eyes   Respiratory:  Negative for cough, chest tightness, shortness of breath and wheezing.    Cardiovascular:  Negative for chest pain.   Gastrointestinal:  Negative for diarrhea, nausea and vomiting.   Musculoskeletal:  Negative for arthralgias, joint swelling and myalgias.   Skin:  Negative for rash.   Neurological:  Negative for headaches.   Hematological:  Negative for adenopathy.   Psychiatric/Behavioral:  Negative for behavioral problems and self-injury.    All other systems reviewed and are negative.        Current Outpatient Medications:     Bioflavonoid  Products (VITAMIN C) CHEW, Take by mouth daily, Disp: , Rfl:     calcium-vitamin D (CALTRATE) 600-400 MG-UNIT per tablet, Take 1 tablet by mouth daily , Disp: , Rfl:     clobetasol (TEMOVATE) 0.05 % external ointment, Apply topically once a week 3times/week, Disp: , Rfl:     Coenzyme Q10 (COQ10) 100 MG CAPS, Take 2 capsules by mouth daily, Disp: , Rfl:     hydrocortisone 2.5 % ointment, Apply topically 2 times daily, Disp: 30 g, Rfl: 1    letrozole (FEMARA) 2.5 MG tablet, Take 2.5 mg by mouth daily, Disp: , Rfl:     levothyroxine (SYNTHROID/LEVOTHROID) 88 MCG tablet, Take 88 mcg by mouth daily, Disp: , Rfl:     rosuvastatin (CRESTOR) 20 MG tablet, Take by mouth daily, Disp: , Rfl:     UNABLE TO FIND, Green tea complex, Disp: , Rfl:   No Known Allergies      EXAM:   BP (!) 140/90   Pulse 99   Wt 78 kg (172 lb)   SpO2 97%   BMI 27.76 kg/m      Physical Exam    Constitutional:       General: She is not in acute distress.     Appearance: Normal appearance. She is not ill-appearing.   HENT:      Head: Normocephalic and atraumatic.      Nose: Nose normal. No congestion or rhinorrhea.      Mouth/Throat:      Mouth: Mucous membranes are moist.      Pharynx: Oropharynx is clear. No posterior oropharyngeal erythema.   Eyes:      General:         Right eye: No discharge.  Mild erythema of lower eyelid     Left eye: No discharge. Mild erythema of lower eyelid.  Left upper eyelid is swollen.    Cardiovascular:      Rate and Rhythm: Normal rate and regular rhythm.      Heart sounds: Normal heart sounds.   Pulmonary:      Effort: Pulmonary effort is normal.      Breath sounds: Normal breath sounds. No wheezing or rhonchi.   Skin:     General: Skin is warm.      Findings: No erythema or rash.   Neurological:      General: No focal deficit present.      Mental Status: She is alert. Mental status is at baseline.   Psychiatric:         Mood and Affect: Mood normal.         Behavior: Behavior normal.         ASSESSMENT/PLAN:  Tyra Marsh is a 68 year old female seen today for eye concerns with eye swelling and itching over the last 3 to 4 weeks happening intermittently.  No obvious trigger.  We will check blood test for dust mite and cat.  Also made medication recommendations.  She has concerns about eyedrops due to recent recall of multiple eyedrops.    Zaditor eyedrops, 1 drop each eye twice daily (hold off for now)  Hydrocortisone apply to eyelids at night for 3 days, then as needed for eyelid redness  Zyrtec 10 mg at night x 1 week, as needed for itching  Will check labs    Follow-up in 2 weeks      Thank you for allowing me to participate in the care of Tyra Marsh.      I spent 35 minutes on the date of the encounter doing chart review, history and exam, documentation and further coordination as noted above exclusive of separately reported interpretations    Sathish Castillo MD  Allergy/Immunology  [facility]

## 2023-11-24 LAB
CAT DANDER IGG QN: 0.19 KU(A)/L
D FARINAE IGE QN: <0.1 KU(A)/L
D PTERONYSS IGE QN: <0.1 KU(A)/L

## 2023-12-05 ENCOUNTER — OFFICE VISIT (OUTPATIENT)
Dept: ALLERGY | Facility: CLINIC | Age: 68
End: 2023-12-05
Attending: INTERNAL MEDICINE
Payer: MEDICARE

## 2023-12-05 VITALS
DIASTOLIC BLOOD PRESSURE: 87 MMHG | WEIGHT: 172 LBS | SYSTOLIC BLOOD PRESSURE: 136 MMHG | BODY MASS INDEX: 27.76 KG/M2 | OXYGEN SATURATION: 97 % | HEART RATE: 72 BPM

## 2023-12-05 DIAGNOSIS — H57.9 PRURITUS OF BOTH EYES: ICD-10-CM

## 2023-12-05 DIAGNOSIS — L30.9 DERMATITIS: ICD-10-CM

## 2023-12-05 PROCEDURE — 99212 OFFICE O/P EST SF 10 MIN: CPT | Performed by: INTERNAL MEDICINE

## 2023-12-05 NOTE — LETTER
2023         RE: Tyra Marsh  [address]  [place] [address]        Dear Colleague,    Thank you for referring your patient, Tyra Marsh, to the [facility] [place]. Please see a copy of my visit note below.    Tyra Marsh was seen in the Allergy Clinic at [facility].    Tyra Marsh is a 68 year old female being seen today for ongoing evaluation of eye inflammation.     At the last appointment blood test was drawn and she had a mild cat allergy at 0.19.  She was treated with both hydrocortisone topically and Zyrtec and she achieved benefit with resolution of the swelling and rash.      Was intermittent for 3 to 4 weeks occurring a few times a week.  She will have red and swollen eyelids bilaterally both upper and lower.  Today after careful consideration she does wonder if mascara was the culprit.  She was noticing a delayed reaction occurring 24 to 48 hours after the use.  She only uses that product intermittently.      Past Medical History:   Diagnosis Date     Breast cancer (H)     Right ( Lumpectomy, Radiation and Chemotherapy )     Breast cancer (H)     Left ( Lumpectomy, Radiation and Chemotherapy)     Difficult intubation      Hyperlipidaemia      Hypertension      PONV (postoperative nausea and vomiting)      Skin cancer     removed from  nose     Thyroid disease      Unspecified adverse effect of unspecified drug, medicinal and biological substance      Family History   Problem Relation Age of Onset     Thyroid Disease Mother      Heart Disease Mother      Cerebrovascular Disease Mother         TIA     C.A.D. Father          age 52     Genitourinary Problems Father         kidney disease     Kidney Disease Father      Thyroid Disease Sister      Gallbladder Disease Sister      Colon Cancer No family hx of      Past Surgical History:   Procedure Laterality Date     BREAST SURGERY  ,     Lumpectomies      COLONOSCOPY N/A 12/12/2022    Procedure: COLONOSCOPY;  Surgeon: Jade Ho MD;  Location:  GI     DILATION AND CURETTAGE, OPERATIVE HYSTEROSCOPY WITH MORCELLATOR, COMBINED N/A 1/15/2016    Procedure: COMBINED DILATION AND CURETTAGE, OPERATIVE HYSTEROSCOPY WITH MORCELLATOR;  Surgeon: Leslee Pack MD;  Location:  [facility]     INSERT PORT VASCULAR ACCESS N/A 12/29/2014    Procedure: INSERT PORT VASCULAR ACCESS;  Surgeon: Heena Guerra MD;  Location: [facility] OR     MASTECTOMY SIMPLE BILATERAL, SENTINEL NODE BILATERAL, COMBINED Bilateral 12/29/2014    Procedure: COMBINED MASTECTOMY SIMPLE BILATERAL, SENTINEL NODE BILATERAL;  Surgeon: Heena Guerra MD;  Location: [facility] OR     REMOVE PORT VASCULAR ACCESS N/A 2/17/2016    Procedure: REMOVE PORT VASCULAR ACCESS;  Surgeon: Leonard Bella MD;  Location:  OR         Current Outpatient Medications:      Bioflavonoid Products (VITAMIN C) CHEW, Take by mouth daily, Disp: , Rfl:      calcium-vitamin D (CALTRATE) 600-400 MG-UNIT per tablet, Take 1 tablet by mouth daily , Disp: , Rfl:      clobetasol (TEMOVATE) 0.05 % external ointment, Apply topically once a week 3times/week, Disp: , Rfl:      Coenzyme Q10 (COQ10) 100 MG CAPS, Take 2 capsules by mouth daily, Disp: , Rfl:      hydrocortisone 2.5 % ointment, Apply topically 2 times daily, Disp: 30 g, Rfl: 1     letrozole (FEMARA) 2.5 MG tablet, Take 2.5 mg by mouth daily, Disp: , Rfl:      levothyroxine (SYNTHROID/LEVOTHROID) 88 MCG tablet, Take 88 mcg by mouth daily, Disp: , Rfl:      rosuvastatin (CRESTOR) 20 MG tablet, Take by mouth daily, Disp: , Rfl:      UNABLE TO FIND, Green tea complex, Disp: , Rfl:   No Known Allergies      EXAM:   There were no vitals taken for this visit.     Latest Reference Range & Units 11/21/23 14:54   Allergen Cat Dander <0.10 KU(A)/L 0.19 (H)   Allergen D farinae <0.10 KU(A)/L <0.10   Allergen, D Pteronyssinus <0.10 KU(A)/L <0.10   (H): Data is abnormally  high    ASSESSMENT/PLAN:  Tyra Marsh is a 68 year old female seen today for a reaction on her eyes that after consideration may have been related to mascara.  We do not have a skin or blood test available for mascara.  We talked about patch testing that could be available with dermatology but she is not interested at this time.  She will avoid that product.  She will use hydrocortisone and Zyrtec as needed in the future if the swelling reappears.      Thank you for allowing me to participate in the care of Tyra Marsh.      I spent 15 minutes on the date of the encounter doing chart review, history and exam, documentation and further coordination as noted above exclusive of separately reported interpretations    Sathish Castillo MD  Allergy/Immunology  [facility]      Again, thank you for allowing me to participate in the care of your patient.        Sincerely,        Sathish Castillo MD

## 2023-12-05 NOTE — PATIENT INSTRUCTIONS
Hydrocortisone apply to eyelids at night for 3 days, then as needed for eyelid redness  Zyrtec 10 mg at night x 1 week, as needed for itching      Allergy Staff Appt Hours Shot Hours Location       Physician   Sathish Castillo MD      Support Staff   Bety CAAL, DACIA FLOWER, DACIA CURTIS, KENTON CADENA MA      Mondays Tuesdays Thursdays and Fridays:      Megan 7-5 Wednesdays         Close                Mondays, Tuesdays and Fridays:  7:20 - 3:40              [facility]  [address]  [place] [address]  Allergy appointment  line: [phone]    Pulmonary Function Scheduling:  [place]: [phone]

## 2023-12-05 NOTE — PROGRESS NOTES
Tyra Marsh was seen in the Allergy Clinic at [facility].    Tyra Marsh is a 68 year old female being seen today for ongoing evaluation of eye inflammation.     At the last appointment blood test was drawn and she had a mild cat allergy at 0.19.  She was treated with both hydrocortisone topically and Zyrtec and she achieved benefit with resolution of the swelling and rash.      Was intermittent for 3 to 4 weeks occurring a few times a week.  She will have red and swollen eyelids bilaterally both upper and lower.  Today after careful consideration she does wonder if mascara was the culprit.  She was noticing a delayed reaction occurring 24 to 48 hours after the use.  She only uses that product intermittently.      Past Medical History:   Diagnosis Date    Breast cancer (H)     Right ( Lumpectomy, Radiation and Chemotherapy )    Breast cancer (H)     Left ( Lumpectomy, Radiation and Chemotherapy)    Difficult intubation     Hyperlipidaemia     Hypertension     PONV (postoperative nausea and vomiting)     Skin cancer     removed from  nose    Thyroid disease     Unspecified adverse effect of unspecified drug, medicinal and biological substance      Family History   Problem Relation Age of Onset    Thyroid Disease Mother     Heart Disease Mother     Cerebrovascular Disease Mother         TIA    C.A.D. Father          age 52    Genitourinary Problems Father         kidney disease    Kidney Disease Father     Thyroid Disease Sister     Gallbladder Disease Sister     Colon Cancer No family hx of      Past Surgical History:   Procedure Laterality Date    BREAST SURGERY  ,     Lumpectomies    COLONOSCOPY N/A 2022    Procedure: COLONOSCOPY;  Surgeon: Jade Ho MD;  Location:  GI    DILATION AND CURETTAGE, OPERATIVE HYSTEROSCOPY WITH MORCELLATOR, COMBINED N/A 1/15/2016    Procedure: COMBINED DILATION AND CURETTAGE, OPERATIVE HYSTEROSCOPY WITH MORCELLATOR;   Surgeon: Leslee Pack MD;  Location:  [facility]    INSERT PORT VASCULAR ACCESS N/A 12/29/2014    Procedure: INSERT PORT VASCULAR ACCESS;  Surgeon: Heena Guerra MD;  Location: [facility] OR    MASTECTOMY SIMPLE BILATERAL, SENTINEL NODE BILATERAL, COMBINED Bilateral 12/29/2014    Procedure: COMBINED MASTECTOMY SIMPLE BILATERAL, SENTINEL NODE BILATERAL;  Surgeon: Heena Guerra MD;  Location: [facility] OR    REMOVE PORT VASCULAR ACCESS N/A 2/17/2016    Procedure: REMOVE PORT VASCULAR ACCESS;  Surgeon: Leonard Bella MD;  Location:  OR         Current Outpatient Medications:     Bioflavonoid Products (VITAMIN C) CHEW, Take by mouth daily, Disp: , Rfl:     calcium-vitamin D (CALTRATE) 600-400 MG-UNIT per tablet, Take 1 tablet by mouth daily , Disp: , Rfl:     clobetasol (TEMOVATE) 0.05 % external ointment, Apply topically once a week 3times/week, Disp: , Rfl:     Coenzyme Q10 (COQ10) 100 MG CAPS, Take 2 capsules by mouth daily, Disp: , Rfl:     hydrocortisone 2.5 % ointment, Apply topically 2 times daily, Disp: 30 g, Rfl: 1    letrozole (FEMARA) 2.5 MG tablet, Take 2.5 mg by mouth daily, Disp: , Rfl:     levothyroxine (SYNTHROID/LEVOTHROID) 88 MCG tablet, Take 88 mcg by mouth daily, Disp: , Rfl:     rosuvastatin (CRESTOR) 20 MG tablet, Take by mouth daily, Disp: , Rfl:     UNABLE TO FIND, Green tea complex, Disp: , Rfl:   No Known Allergies      EXAM:   There were no vitals taken for this visit.     Latest Reference Range & Units 11/21/23 14:54   Allergen Cat Dander <0.10 KU(A)/L 0.19 (H)   Allergen D farinae <0.10 KU(A)/L <0.10   Allergen, D Pteronyssinus <0.10 KU(A)/L <0.10   (H): Data is abnormally high    ASSESSMENT/PLAN:  Tyra Marsh is a 68 year old female seen today for a reaction on her eyes that after consideration may have been related to mascara.  We do not have a skin or blood test available for mascara.  We talked about patch testing that could be available with dermatology but she is  not interested at this time.  She will avoid that product.  She will use hydrocortisone and Zyrtec as needed in the future if the swelling reappears.      Thank you for allowing me to participate in the care of Tyra DAVID Riggsvanessa.      I spent 15 minutes on the date of the encounter doing chart review, history and exam, documentation and further coordination as noted above exclusive of separately reported interpretations    Sathish Castillo MD  Allergy/Immunology  [facility]

## 2024-02-23 ENCOUNTER — TRANSFERRED RECORDS (OUTPATIENT)
Dept: HEALTH INFORMATION MANAGEMENT | Facility: CLINIC | Age: 69
End: 2024-02-23
Payer: MEDICARE

## 2024-05-20 DIAGNOSIS — R69 DIAGNOSIS UNKNOWN: Primary | ICD-10-CM

## 2024-05-28 ENCOUNTER — TRANSFERRED RECORDS (OUTPATIENT)
Dept: HEALTH INFORMATION MANAGEMENT | Facility: CLINIC | Age: 69
End: 2024-05-28

## 2024-08-06 ENCOUNTER — TRANSFERRED RECORDS (OUTPATIENT)
Dept: HEALTH INFORMATION MANAGEMENT | Facility: CLINIC | Age: 69
End: 2024-08-06
Payer: MEDICARE

## 2024-08-06 LAB
ALT SERPL-CCNC: 25 U/L
AST SERPL-CCNC: 28 U/L
CHOLESTEROL (EXTERNAL): 164 MG/DL (ref 50–199)
CREATININE (EXTERNAL): 0.63 MG/DL (ref 0.52–1.04)
GFR ESTIMATED (EXTERNAL): >60 ML/MIN/1.73
GLUCOSE (EXTERNAL): 99 MG/DL (ref 70–99)
HBA1C MFR BLD: 5.9 %
HDLC SERPL-MCNC: 42 MG/DL
LDL CHOLESTEROL CALCULATED (EXTERNAL): 94 MG/DL
POTASSIUM (EXTERNAL): 4.2 MMOL/L (ref 3.5–5.1)
TRIGLYCERIDES (EXTERNAL): 140 MG/DL (ref 10–150)
TSH SERPL-ACNC: 4.83 UIU/ML (ref 0.45–4.5)

## 2024-08-14 ENCOUNTER — TRANSFERRED RECORDS (OUTPATIENT)
Dept: HEALTH INFORMATION MANAGEMENT | Facility: CLINIC | Age: 69
End: 2024-08-14
Payer: MEDICARE

## 2024-09-11 ENCOUNTER — LAB REQUISITION (OUTPATIENT)
Dept: LAB | Facility: CLINIC | Age: 69
End: 2024-09-11
Payer: MEDICARE

## 2024-09-11 DIAGNOSIS — N89.8 OTHER SPECIFIED NONINFLAMMATORY DISORDERS OF VAGINA: ICD-10-CM

## 2024-09-11 PROCEDURE — 87205 SMEAR GRAM STAIN: CPT | Mod: ORL | Performed by: OBSTETRICS & GYNECOLOGY

## 2024-09-13 LAB
BACTERIA SPEC CULT: ABNORMAL
GRAM STAIN RESULT: ABNORMAL

## 2025-02-18 ENCOUNTER — HOSPITAL ENCOUNTER (OUTPATIENT)
Dept: BONE DENSITY | Facility: CLINIC | Age: 70
Discharge: HOME | End: 2025-02-18
Attending: NURSE PRACTITIONER
Payer: MEDICARE

## 2025-02-18 DIAGNOSIS — C50.312: ICD-10-CM

## 2025-02-18 DIAGNOSIS — Z79.811 LONG TERM CURRENT USE OF AROMATASE INHIBITOR: ICD-10-CM

## 2025-02-18 PROCEDURE — 77081 DXA BONE DENSITY APPENDICULR: CPT

## 2025-02-22 ENCOUNTER — HEALTH MAINTENANCE LETTER (OUTPATIENT)
Age: 70
End: 2025-02-22

## 2025-02-25 ENCOUNTER — TRANSFERRED RECORDS (OUTPATIENT)
Dept: HEALTH INFORMATION MANAGEMENT | Facility: CLINIC | Age: 70
End: 2025-02-25
Payer: MEDICARE

## 2025-03-24 ENCOUNTER — LAB REQUISITION (OUTPATIENT)
Dept: LAB | Facility: CLINIC | Age: 70
End: 2025-03-24
Payer: MEDICARE

## 2025-03-24 DIAGNOSIS — L08.9 LOCAL INFECTION OF THE SKIN AND SUBCUTANEOUS TISSUE, UNSPECIFIED: ICD-10-CM

## 2025-03-24 PROCEDURE — 87186 SC STD MICRODIL/AGAR DIL: CPT | Mod: ORL | Performed by: DERMATOLOGY

## 2025-03-24 PROCEDURE — 87070 CULTURE OTHR SPECIMN AEROBIC: CPT | Mod: ORL | Performed by: DERMATOLOGY

## 2025-03-26 LAB
BACTERIA SKIN AEROBE CULT: ABNORMAL

## 2025-03-28 LAB
BACTERIA SKIN AEROBE CULT: ABNORMAL

## 2025-06-24 ENCOUNTER — TRANSFERRED RECORDS (OUTPATIENT)
Dept: HEALTH INFORMATION MANAGEMENT | Facility: CLINIC | Age: 70
End: 2025-06-24
Payer: MEDICARE

## 2025-06-26 ENCOUNTER — ANCILLARY PROCEDURE (OUTPATIENT)
Dept: CT IMAGING | Facility: CLINIC | Age: 70
End: 2025-06-26
Attending: INTERNAL MEDICINE
Payer: MEDICARE

## 2025-06-26 DIAGNOSIS — C50.312 MALIGNANT NEOPLASM OF LOWER-INNER QUADRANT OF LEFT FEMALE BREAST (H): ICD-10-CM

## 2025-06-26 DIAGNOSIS — C50.312: ICD-10-CM

## 2025-06-26 DIAGNOSIS — R59.0 AXILLARY LYMPHADENOPATHY: ICD-10-CM

## 2025-06-26 PROCEDURE — 70491 CT SOFT TISSUE NECK W/DYE: CPT

## 2025-06-26 PROCEDURE — 250N000011 HC RX IP 250 OP 636: Performed by: INTERNAL MEDICINE

## 2025-06-26 PROCEDURE — 250N000009 HC RX 250: Performed by: INTERNAL MEDICINE

## 2025-06-26 PROCEDURE — 71260 CT THORAX DX C+: CPT

## 2025-06-26 RX ORDER — IOPAMIDOL 755 MG/ML
117 INJECTION, SOLUTION INTRAVASCULAR ONCE
Status: COMPLETED | OUTPATIENT
Start: 2025-06-26 | End: 2025-06-26

## 2025-06-26 RX ADMIN — SODIUM CHLORIDE 40 ML: 9 INJECTION, SOLUTION INTRAVENOUS at 10:03

## 2025-06-26 RX ADMIN — IOPAMIDOL 117 ML: 755 INJECTION, SOLUTION INTRAVENOUS at 10:03

## 2025-07-08 ENCOUNTER — TRANSFERRED RECORDS (OUTPATIENT)
Dept: HEALTH INFORMATION MANAGEMENT | Facility: CLINIC | Age: 70
End: 2025-07-08
Payer: MEDICARE

## 2025-07-24 ENCOUNTER — OFFICE VISIT (OUTPATIENT)
Dept: FAMILY MEDICINE | Facility: CLINIC | Age: 70
End: 2025-07-24
Payer: MEDICARE

## 2025-07-24 VITALS
RESPIRATION RATE: 18 BRPM | HEIGHT: 66 IN | DIASTOLIC BLOOD PRESSURE: 81 MMHG | WEIGHT: 182.2 LBS | TEMPERATURE: 97.5 F | OXYGEN SATURATION: 97 % | BODY MASS INDEX: 29.28 KG/M2 | SYSTOLIC BLOOD PRESSURE: 145 MMHG | HEART RATE: 59 BPM

## 2025-07-24 DIAGNOSIS — H61.21 IMPACTED CERUMEN OF RIGHT EAR: Primary | ICD-10-CM

## 2025-07-24 DIAGNOSIS — R03.0 ELEVATED BLOOD PRESSURE READING WITHOUT DIAGNOSIS OF HYPERTENSION: ICD-10-CM

## 2025-07-24 DIAGNOSIS — R09.82 POST-NASAL DRAINAGE: ICD-10-CM

## 2025-07-24 RX ORDER — METRONIDAZOLE 10 MG/G
GEL TOPICAL
COMMUNITY

## 2025-07-24 RX ORDER — ESTRADIOL 0.1 MG/G
CREAM VAGINAL
COMMUNITY
Start: 2023-05-15

## 2025-07-24 RX ORDER — CEPHALEXIN 500 MG/1
500 CAPSULE ORAL 4 TIMES DAILY
COMMUNITY
Start: 2025-03-24

## 2025-07-24 RX ORDER — SULFAMETHOXAZOLE AND TRIMETHOPRIM 800; 160 MG/1; MG/1
1 TABLET ORAL
COMMUNITY
Start: 2025-03-26

## 2025-07-24 RX ORDER — FLUCONAZOLE 150 MG/1
TABLET ORAL
COMMUNITY

## 2025-07-24 RX ORDER — FLUTICASONE PROPIONATE 50 MCG
2 SPRAY, SUSPENSION (ML) NASAL AT BEDTIME
Qty: 16 ML | Refills: 1 | Status: CANCELLED | OUTPATIENT
Start: 2025-07-24

## 2025-07-24 RX ORDER — GINGER ROOT/GINGER ROOT EXT 262.5 MG
1 CAPSULE ORAL
COMMUNITY

## 2025-07-24 RX ORDER — FLUCONAZOLE 200 MG/1
TABLET ORAL
COMMUNITY

## 2025-07-24 ASSESSMENT — PAIN SCALES - GENERAL: PAINLEVEL_OUTOF10: NO PAIN (0)

## 2025-07-24 NOTE — PROGRESS NOTES
"  Assessment & Plan     Impacted cerumen of right ear  Ear wash performed by MA with complete removal. Symptoms largely improved after removal.     Post-nasal drainage  Likely allergies contributing. Add allegra/claritin/zyrtec once daily. Add flonase at bedtime. Stop sudafed.     Elevated blood pressure reading without diagnosis of hypertension  Recent sudafed use which she will stop. No history of hypertension      Denise Kirkpatrick PA-C on 7/24/2025 at 8:56 AM      Subjective   Pat is a 70 year old, presenting for the following health issues:  Ear Problem        7/24/2025     8:39 AM   Additional Questions   Roomed by [facility]     History of Present Illness       Reason for visit:  Problem with right ear feeling clogged, hard to hear out of it now  Symptoms include:  Ear feels clogged, but won't pop, hard to hear  Symptom progression:  Worsening  Had these symptoms before:  No  What makes it worse:  No  What makes it better:  No   She is taking medications regularly.      About a month of feeling like fluid in right ear  Worse yesterday when she woke up, can hardly hear out of the ear  No pain   No ear drainage or bleeding  Took sudafed yesterday, helped sinus congestion but didn't help ear   Some allergies this year, has post nasal drainage  Cleans ears with qtips daily         Objective    BP (!) 145/81 (BP Location: Left arm, Patient Position: Sitting, Cuff Size: Adult Regular)   Pulse 59   Temp 97.5  F (36.4  C) (Tympanic)   Resp 18   Ht 1.664 m (5' 5.5\")   Wt 82.6 kg (182 lb 3.2 oz)   LMP  (LMP Unknown)   SpO2 97%   Breastfeeding No   BMI 29.86 kg/m    Body mass index is 29.86 kg/m .  Physical Exam   GENERAL: alert and no distress  EYES: Eyes grossly normal to inspection, PERRL and conjunctivae and sclerae normal  HENT: normal cephalic/atraumatic, right ear: occluded with wax, and left ear: trace clear effusion otherwise normal. POST WASH - right ear: canal clear, clear effusion   PSYCH: " mentation appears normal, affect normal/bright        Signed Electronically by: Denise Kirkpatrick PA-C

## 2025-07-24 NOTE — PROCEDURES
Patient identified using two patient identifiers.  Ear exam showing wax occlusion completed by RN.  Solution: warm water was placed in the right ear(s) via irrigation tool: elephant ear.   Emmy Corrales CMA on 7/24/2025 at 9:41 AM

## 2025-07-30 ENCOUNTER — TRANSFERRED RECORDS (OUTPATIENT)
Dept: HEALTH INFORMATION MANAGEMENT | Facility: CLINIC | Age: 70
End: 2025-07-30
Payer: MEDICARE

## 2025-08-05 ENCOUNTER — TRANSFERRED RECORDS (OUTPATIENT)
Dept: HEALTH INFORMATION MANAGEMENT | Facility: CLINIC | Age: 70
End: 2025-08-05
Payer: MEDICARE

## 2025-08-07 ENCOUNTER — TRANSFERRED RECORDS (OUTPATIENT)
Dept: HEALTH INFORMATION MANAGEMENT | Facility: CLINIC | Age: 70
End: 2025-08-07
Payer: MEDICARE

## 2025-08-13 ENCOUNTER — HOSPITAL ENCOUNTER (OUTPATIENT)
Dept: MAMMOGRAPHY | Facility: CLINIC | Age: 70
Discharge: HOME | End: 2025-08-13
Attending: INTERNAL MEDICINE
Payer: MEDICARE

## 2025-08-13 DIAGNOSIS — C50.919 PRIMARY MALIGNANT NEOPLASM OF FEMALE BREAST (H): ICD-10-CM

## 2025-08-13 DIAGNOSIS — R22.31 AXILLARY MASS, RIGHT: ICD-10-CM

## 2025-08-13 DIAGNOSIS — Z90.13 HISTORY OF BILATERAL MASTECTOMY: ICD-10-CM

## 2025-08-13 DIAGNOSIS — R94.8 ABNORMAL POSITRON EMISSION TOMOGRAPHY (PET) SCAN: ICD-10-CM

## 2025-08-13 DIAGNOSIS — R59.0 AXILLARY LYMPHADENOPATHY: ICD-10-CM

## 2025-08-13 DIAGNOSIS — C50.511 BREAST CANCER OF LOWER-OUTER QUADRANT OF RIGHT FEMALE BREAST (H): ICD-10-CM

## 2025-08-13 DIAGNOSIS — N64.59 OTHER SIGNS AND SYMPTOMS IN BREAST: ICD-10-CM

## 2025-08-13 DIAGNOSIS — R59.0 AXILLARY ADENOPATHY: ICD-10-CM

## 2025-08-13 PROCEDURE — 250N000009 HC RX 250: Performed by: INTERNAL MEDICINE

## 2025-08-13 PROCEDURE — 272N000615 US BREAST BIOPSY CORE LYMPH NODE RIGHT: Mod: RT

## 2025-08-13 PROCEDURE — 76882 US LMTD JT/FCL EVL NVASC XTR: CPT | Mod: RT

## 2025-08-13 RX ADMIN — LIDOCAINE HYDROCHLORIDE 5 ML: 10 INJECTION, SOLUTION INFILTRATION; PERINEURAL at 10:46

## 2025-08-18 ENCOUNTER — TRANSFERRED RECORDS (OUTPATIENT)
Dept: HEALTH INFORMATION MANAGEMENT | Facility: CLINIC | Age: 70
End: 2025-08-18
Payer: MEDICARE

## 2025-08-21 LAB — INTERPRETATION: NORMAL

## (undated) DEVICE — KIT ENDO TURNOVER/PROCEDURE W/CLEAN A SCOPE LINERS 103888

## (undated) RX ORDER — FENTANYL CITRATE 0.05 MG/ML
INJECTION, SOLUTION INTRAMUSCULAR; INTRAVENOUS
Status: DISPENSED
Start: 2022-12-12

## (undated) RX ORDER — SIMETHICONE 40MG/0.6ML
SUSPENSION, DROPS(FINAL DOSAGE FORM)(ML) ORAL
Status: DISPENSED
Start: 2022-12-12